# Patient Record
Sex: MALE | Employment: UNEMPLOYED | URBAN - METROPOLITAN AREA
[De-identification: names, ages, dates, MRNs, and addresses within clinical notes are randomized per-mention and may not be internally consistent; named-entity substitution may affect disease eponyms.]

---

## 2020-07-31 ENCOUNTER — HOSPITAL ENCOUNTER (OUTPATIENT)
Dept: BEHAVIORAL HEALTH | Facility: CLINIC | Age: 25
Discharge: HOME | End: 2020-07-31
Attending: PSYCHIATRY & NEUROLOGY | Admitting: PSYCHIATRY & NEUROLOGY
Payer: COMMERCIAL

## 2020-07-31 DIAGNOSIS — F33.1 MAJOR DEPRESSIVE DISORDER, RECURRENT EPISODE, MODERATE (H): ICD-10-CM

## 2020-07-31 PROCEDURE — 90791 PSYCH DIAGNOSTIC EVALUATION: CPT | Mod: 95 | Performed by: SOCIAL WORKER

## 2020-07-31 RX ORDER — VENLAFAXINE HYDROCHLORIDE 225 MG/1
225 TABLET, EXTENDED RELEASE ORAL DAILY
COMMUNITY

## 2020-07-31 RX ORDER — MIRTAZAPINE 15 MG/1
15 TABLET, FILM COATED ORAL AT BEDTIME
COMMUNITY

## 2020-07-31 ASSESSMENT — COLUMBIA-SUICIDE SEVERITY RATING SCALE - C-SSRS
6. HAVE YOU EVER DONE ANYTHING, STARTED TO DO ANYTHING, OR PREPARED TO DO ANYTHING TO END YOUR LIFE?: NO
1. IN THE PAST MONTH, HAVE YOU WISHED YOU WERE DEAD OR WISHED YOU COULD GO TO SLEEP AND NOT WAKE UP?: NO
TOTAL  NUMBER OF INTERRUPTED ATTEMPTS PAST 3 MONTHS: NO
ATTEMPT PAST THREE MONTHS: NO
4. HAVE YOU HAD THESE THOUGHTS AND HAD SOME INTENTION OF ACTING ON THEM?: NO
TOTAL  NUMBER OF INTERRUPTED ATTEMPTS LIFETIME: NO
6. HAVE YOU EVER DONE ANYTHING, STARTED TO DO ANYTHING, OR PREPARED TO DO ANYTHING TO END YOUR LIFE?: NO
TOTAL  NUMBER OF ABORTED OR SELF INTERRUPTED ATTEMPTS PAST 3 MONTHS: NO
2. HAVE YOU ACTUALLY HAD ANY THOUGHTS OF KILLING YOURSELF?: NO
5. HAVE YOU STARTED TO WORK OUT OR WORKED OUT THE DETAILS OF HOW TO KILL YOURSELF? DO YOU INTEND TO CARRY OUT THIS PLAN?: NO
TOTAL  NUMBER OF ABORTED OR SELF INTERRUPTED ATTEMPTS PAST LIFETIME: NO
1. IN THE PAST MONTH, HAVE YOU WISHED YOU WERE DEAD OR WISHED YOU COULD GO TO SLEEP AND NOT WAKE UP?: YES
ATTEMPT LIFETIME: NO

## 2020-07-31 ASSESSMENT — ANXIETY QUESTIONNAIRES
7. FEELING AFRAID AS IF SOMETHING AWFUL MIGHT HAPPEN: MORE THAN HALF THE DAYS
IF YOU CHECKED OFF ANY PROBLEMS ON THIS QUESTIONNAIRE, HOW DIFFICULT HAVE THESE PROBLEMS MADE IT FOR YOU TO DO YOUR WORK, TAKE CARE OF THINGS AT HOME, OR GET ALONG WITH OTHER PEOPLE: EXTREMELY DIFFICULT
4. TROUBLE RELAXING: SEVERAL DAYS
GAD7 TOTAL SCORE: 10
3. WORRYING TOO MUCH ABOUT DIFFERENT THINGS: MORE THAN HALF THE DAYS
5. BEING SO RESTLESS THAT IT IS HARD TO SIT STILL: NOT AT ALL
2. NOT BEING ABLE TO STOP OR CONTROL WORRYING: MORE THAN HALF THE DAYS
6. BECOMING EASILY ANNOYED OR IRRITABLE: MORE THAN HALF THE DAYS
1. FEELING NERVOUS, ANXIOUS, OR ON EDGE: SEVERAL DAYS

## 2020-07-31 ASSESSMENT — PAIN SCALES - GENERAL: PAINLEVEL: NO PAIN (0)

## 2020-07-31 ASSESSMENT — PATIENT HEALTH QUESTIONNAIRE - PHQ9: SUM OF ALL RESPONSES TO PHQ QUESTIONS 1-9: 16

## 2020-07-31 NOTE — ADDENDUM NOTE
Encounter addended by: Bernardo Espinosa LIC on: 7/31/2020 4:25 PM   Actions taken: Clinical Note Signed

## 2020-07-31 NOTE — PATIENT INSTRUCTIONS
"Outpatient Mental Health Services - Adult    MY COPING PLAN FOR SAFETY    PATIENT'S NAME: Chi Henderson  MRN:   [id number]    SAFETY PLAN:    Step 1: Warning signs / cues (Thoughts, images, mood, situation, behavior) that a crisis may be developing:      Thoughts: \"I can't do this anymore\"    Images: none    Thinking Processes: ruminations (can't stop thinking about my problems)    Mood: worsening depression and intense worry    Behaviors: isolating/withdrawing , not taking care of myself and not taking care of my responsibilities    Situations: none       Step 2: Coping strategies - Things I can do to take my mind off of my problems without contacting another person (relaxation technique, physical activity):      Distress Tolerance Strategies:  play games, go to sleep    Physical Activities: exercise    Focus on helpful thoughts:  \"This is temporary\"    Step 3: People and social settings that provide distraction:     Name: Yajaira (mother) Phone:    Name: Florentino (father) Phone:         Step 4: Remind myself of people and things that are important to me and worth living for:  Family    Step 5: When I am in crisis, I can ask these people to help me use my safety plan:     Name: Yajaira (mother) Phone:    Name: Florentino (father) Phone:           Step 6: Making the environment safe:       No, I'm not in danger.      Step 7: Professionals or agencies I can contact during a crisis:      Suicide Prevention Lifeline: 9-912-837-TALK (5304)    Crisis Text Line Service (available 24 hours a day, 7 days a week): Text [place] to 305807    Call  **CRISIS (991393) from a cell phone to talk to a team of professionals who can help you.    Crisis Services By [facility]: Phone Number:   [place]     [phone]   [place]    [phone]   [place]    [phone]   [place]    [phone]   [place]    [phone]   [place] [phone]   [place]     [phone]       Call 911 or go to my nearest emergency department.     I helped develop this " safety plan and agree to use it when needed.  I have been given a copy of this plan.        Today s date:  7/31/2020  Adapted from Safety Plan Template 2008 Katherine Mike and Adrian Malhotra is reprinted with the express permission of the authors.  No portion of the Safety Plan Template may be reproduced without the express, written permission.  You can contact the authors at [email or web address] or [email or web address]

## 2020-07-31 NOTE — PROGRESS NOTES
"    Adult Mental Health Day Treatment  Evaluator Name:  Natividad Al     Credentials:  Health system    PATIENT'S NAME: Chi Henderson  PREFERRED NAME: Chi  PREFERRED PRONOUNS:  Denilson,     MRN:   [id number]  :   1995   ACCT. NUMBER: [id number]  DATE OF SERVICE: 20  START TIME: 2:30pm  END TIME: 3:30pm  PREFERRED PHONE: [phone]   May we leave a program related message: Yes  Service Modality:  Phone Visit:    The patient has been notified of the following:      \"We have found that certain health care needs can be provided without the need for a face to face visit.  This service lets us provide the care you need with a phone conversation.       I will have full access to your [facility] medical record during this entire phone call.   I will be taking notes for your medical record.      Since this is like an office visit, we will bill your insurance company for this service.       There are potential benefits and risks of telephone visits (e.g. limits to patient confidentiality) that differ from in-person visits.?  Confidentiality still applies for telephone services, and nobody will record the visit.  It is important to be in a quiet, private space that is free of distractions (including cell phone or other devices) during the visit.??      If during the course of the call I believe a telephone visit is not appropriate, you will not be charged for this service\"     Consent has been obtained for this service by care team member: Yes     STANDARD ADULT DIAGNOSTIC ASSESSMENT    Identifying Information:  Patient is a 25 year old, .  The pronoun use throughout this assessment reflects the patient's chosen pronoun.  Patient was referred for an assessment by current behavioral health provider.  Patient attended the session alone.     Chief Complaint:   The reason for seeking services at this time is: \" since the beginning of the year I quit my job and started doing nothing. 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 I goldy became depressed \"   The problem(s) began beginning of the year. Patient has attempted to resolve these concerns in the past through medication management and a couple sessons of therapy.  The patient reports he became more depressed, lack of interest in normal activities, low energy, isolating and it's getting worse.  Patient reports his parents insisted he get help so his medication management provider referred him to .     Social/Family History:  Patient reported they grew up in [place], [place].  They were raised by biological parents.   stayed .. The patient has two siblings but they don't keep in contact at this time.     Patient reported that  his   childhood was  \"okay, my brother was violent because he was on drugs, there was lots of tension\".  Patient described their current relationships with family of origin as \"our relationship is good. My mom is my emotional support. \"     The patient describes their cultural background as American. Middle class.  Cultural influences and impact on patient's life structure, values, norms, and healthcare: NA.  Contextual influences on patient's health include: Individual Factors -management of symptoms, unemployed.    These factors will be addressed in the Preliminary Treatment plan.  Patient identified their preferred language to be English. Patient reported they does not need the assistance of an  or other support involved in therapy.     Patient reported had no significant delays in developmental tasks.   Patient's highest education level was college graduate. Patient identified the following learning problems: none reported.  Modifications will not be used to assist communication in therapy.   Patient reports they are  able to understand written materials.    Patient reported the following relationship history.  Patient's current relationship status is single.   Patient identified their sexual orientation as lang.  Patient " reported having zero child(marilou). Patient identified parents as part of their support system.  Patient identified the quality of these relationships as stable and meaningful.      Patient's current living/housing situation involves staying in own home/apartment.  They live with roommates and they report that housing is stable.     Patient is currently unemployed.  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  Patient does not identify finances as a current stressor.      Patient reported that they have not been involved with the legal system.   Patient denies being on probation / parole / under the jurisdiction of the court.        Patient's Strengths and Limitations:  Patient identified the following strengths or resources that will help them succeed in treatment: commitment to health and well being, family support and insight. Things that may interfere with the patient's success in treatment include: none identified.   _______________________________________________  Personal and Family Medical History:   Patient did report a family history of mental health concerns.  Patient reports family history includes Anxiety Disorder in his mother; Bipolar Disorder in his brother; Depression in his mother; Substance Abuse in his brother..     Patient reported the following previous diagnoses which include(s): an Anxiety Disorder, a Bipolar Disorder, Depression and Obsessive Compulsive Disorder.  Patient reported symptoms began at age 9.   Patient has received mental health services in the past: therapy and psychiatry. .  Psychiatric Hospitalizations: None.  Patient denies a history of civil commitment.  Currently, patient is receiving other mental health services.  These include psychiatry with [facility].  Next appointment: unknown.   Patient has had a physical exam to rule out medical causes for current symptoms.  Date of last physical exam was within the past year. Client was encouraged to follow up  with PCP if symptoms were to develop. The patient has a non-[facility] Primary Care Provider. Their PCP is [facility]..  Patient reports no current medical concerns.  There are not significant appetite / nutritional concerns / weight changes.   Patient does not report a history of head injury / trauma / cognitive impairment.      Patient reports current meds as:   Outpatient Medications Marked as Taking for the 7/31/20 encounter (Hospital Encounter) with Bernardo Espinosa LICSW   Medication Sig     mirtazapine (REMERON) 15 MG tablet Take 15 mg by mouth At Bedtime     venlafaxine (EFFEXOR-ER) 225 MG 24 hr tablet Take 225 mg by mouth daily       Medication Adherence:  Patient reports taking prescribed medications as prescribed.    Patient Allergies:  No Known Allergies    Medical History:    Past Medical History:   Diagnosis Date     ADD (attention deficit disorder)      Anxiety      Asperger's disorder      Depressive disorder      OCD (obsessive compulsive disorder)          Current Mental Status Exam:   Unable to complete full MSE. DA completed via a telephone visit      Rating Scales:    PHQ9:    PHQ-9 SCORE 7/31/2020   PHQ-9 Total Score 16   ;    GAD7:    BERYL-7 SCORE 7/31/2020   Total Score 10     CGI:     First:Considering your total clinical experience with this particular patient population, how severe are the patient's symptoms at this time?: 5 (7/31/2020  4:03 PM)  ;    Most recentCompared to the patient's condition at the START of treatment, this patient's condition is: 4 (7/31/2020  4:03 PM)      Substance Use:  Patient did not report a family history of substance use concerns; see medical history section for details.  Patient has not received chemical dependency treatment in the past.  Patient has not ever been to detox.      Patient is not currently receiving any chemical dependency treatment. Patient reported the following problems as a result of their substance use: not  "applicable.    Patient denies using alcohol.  Patient denies using tobacco.  Patient denies using marijuana.  Patient denies using caffeine.  Patient reports using/abusing the following substance(s). Patient reported no other substance use.     CAGE- AID:    CAGE-AID Total Score 7/31/2020   Total Score 0       Substance Use: not applicable    Based on the negative CAGE score and clinical interview there  are not indications of drug or alcohol abuse.      Significant Losses / Trauma / Abuse / Neglect Issues:   Patient did not serve in the .  There are indications or report of significant loss, trauma, abuse or neglect issues related to: none reported.  Concerns for possible neglect are not present.     Safety Assessment:   Current Safety Concerns: The patient reports there is no history of suicide attempts. The patient endorses some suicidal ideation when he is lying in his bed not sleeping. He thinks about it.  He reports he imagines how he would do it but has never acted on it.  Patient reports he would never attempt on his own life because \"I have a respectful fear of death\".  A safety plan was co-developed.       [place] Suicide Severity Rating Scale (Lifetime/Recent)  [place] Suicide Severity Rating (Lifetime/Recent) 7/31/2020   1. Wish to be Dead (Lifetime) Yes   1. Wish to be Dead (Recent) No   2. Non-Specific Active Suicidal Thoughts (Recent) No   3. Active Sucidal Ideation with any Methods (Not Plan) Without Intent to Act (Recent) No   4. Active Suicidal Ideation with Some Intent to Act, Without Specific Plan (Recent) No   5. Active Suicidal Ideation with Specific Plan and Intent (Recent) No   Actual Attempt (Lifetime) No   Actual Attempt (Past 3 Months) No   Has subject engaged in non-suicidal self-injurious behavior? (Lifetime) Yes   Has subject engaged in non-suicidal self-injurious behavior? (Past 3 Months) No   Interrupted Attempts (Lifetime) No   Interrupted Attempts (Past 3 Months) No "   Aborted or Self-Interrupted Attempt (Lifetime) No   Aborted or Self-Interrupted Attempt (Past 3 Months) No   Preparatory Acts or Behavior (Lifetime) No   Preparatory Acts or Behavior (Past 3 Months) No     Patient denies current homicidal ideation and behaviors.  Patient denies current self-injurious ideation and behaviors.   not within year  Patient denied risk behaviors associated with substance use.  Patient denies any high risk behaviors associated with mental health symptoms.  Patient reports the following current concerns for their personal safety: None.  Patient reports there are no firearms in the house. .     History of Safety Concerns:  Patient denied a history of homicidal ideation.     Patient denied a history of personal safety concerns.    Patient denied a history of assaultive behaviors.    Patient denied a history of sexual assault behaviors.     Patient denied a history of risk behaviors associated with substance use.  Patient denies any history of high risk behaviors associated with mental health symptoms.  Patient reports the following protective factors: positive relationships positive family connections, forward/future oriented thinking, dedication to family/friends, safe and stable environment, abstinence from substances, adherence with prescribed medication, agreement to use safety plan, living with other people and healthy fear of risky behaviors or pain    Risk Plan:  See Preliminary Treatment Plan for Safety and Risk Management Plan    Review of Symptoms per patient report:  Depression: Change in sleep, Lack of interest, Change in energy level, Difficulties concentrating, Suicidal ideation, Feelings of helplessness, Low self-worth, Feeling sad, down, or depressed and Withdrawn  Peri:  No Symptoms  Psychosis: No Symptoms  Anxiety: Excessive worry, Ruminations, Poor concentration and Irritability  Panic:  No symptoms  Post Traumatic Stress Disorder:  No Symptoms   Eating Disorder: No  Symptoms  ADD / ADHD:  No symptoms  Conduct Disorder: No symptoms  Autism Spectrum Disorder: No symptoms and cece reports he was diagnosed with aspergers as a child because they thought he was socially awkward  Obsessive Compulsive Disorder: No Symptoms    Patient reports the following compulsive behaviors and treatment history: none.      Diagnostic Criteria:   A) Recurrent episode(s) - symptoms have been present during the same 2-week period and represent a change from previous functioning 5 or more symptoms (required for diagnosis)   - Depressed mood. Note: In children and adolescents, can be irritable mood.     - Diminished interest or pleasure in all, or almost all, activities.    - Fatigue or loss of energy.    - Diminished ability to think or concentrate, or indecisiveness.    - Recurrent thoughts of death (not just fear of dying), recurrent suicidal ideation without a specific plan, or a suicide attempt or a specific plan for committing suicide.   B) The symptoms cause clinically significant distress or impairment in social, occupational, or other important areas of functioning  C) The episode is not attributable to the physiological effects of a substance or to another medical condition  D) The occurence of major depressive episode is not better explained by other thought / psychotic disorders  E) There has never been a manic episode or hypomanic episode    Functional Status:  Patient reports the following functional impairments: management of the household and or completion of tasks, social interactions and work / vocational responsibilities.     WHODAS:   WHODAS 2.0 Total Score 7/31/2020   Total Score 37       Clinical Summary:  1. Reason for assessment: to determine appropriate level of care  .  2. Psychosocial, Cultural and Contextual Factors: unemployed  .  3. Principal DSM5 Diagnoses  (Sustained by DSM5 Criteria Listed Above):   296.32 (F33.1) Major Depressive Disorder, Recurrent Episode, Moderate  _.  4. Other Diagnoses that is relevant to services:   300.02 (F41.1) Generalized Anxiety Disorder by history  5. Provisional Diagnosis:  NA as evidenced by NA .  6. Prognosis: Return to Normal Functioning and Expect Improvement.  7. Likely consequences of symptoms if not treated: If untreated, patient's mental health will likely deteriorate. Patient reports ongoing symptoms of depression and anxiety and passive SI.  A day treatment program is recommended.  PHP was considered but patient felt that would be too intensive.  Patient has protective factors that mitigate risk of harm to self. A safety plan was developed  8. Client strengths include:  goal-focused, good listener, has a previous history of therapy, insightful, motivated and support of family, friends and providers .     Recommendations:     1. Plan for Safety and Risk Management:A safety and risk management plan has been developed including: Patient consented to co-developed safety plan.  Safety and risk management plan was completed.  Patient agreed to use safety plan should any safety concerns arise.  A copy was given to the patient..  Report to child / adult protection services was NA.     2. Patient did not identify Muslim, ethnic or cultural issues relevant to therapy at this time     3. Initial Treatment will focus on: Depressed Mood -   Anxiety -   Functional Impairment at: home  Risk Management / Safety Concerns related to: Suicidal ideation.     4. Resources/Service Plan:       services are not indicated.     Modifications to assist communication are not indicated.     Additional disability accommodations are not indicated.      5. Collaboration:  Collaboration / coordination of treatment will be initiated with the following support professionals: psychiatry.      6.  Referrals:  The following referral(s) will be initiated: Day Treatment. Next Scheduled Appointment: 8/3/20.  A Release of Information has been obtained for the  "following: psychiatry and emergency contact .    7. MATT: MATT:  Discussed the general effects of drugs and alcohol on health and well-being.     8. Records were reviewed at time of assessment.    Information in this assessment was obtained from the medical record and provided by patient who is a good historian.   Patient will have open access to their mental health medical record.      Eval type:  Mental Health    Staff Name/Credentials:  ADITYA Donis  2020                        LOCUS Worksheet     Name: Chi Henderson MRN: [id number]    : 1995      Gender:  male    PMI:     Provider Name: [facility]   Provider NPI:  [id number]     Actual level of Care Provided:  Medication management    Service(s) receiving or referred to:  Day treatment    Reason for Variance: Due to patient reporting worsening symptoms of depression and passive suicidal ideation      Rating completed by: ADITYA Thompson      I. Risk of Harm:   2      Low Risk of Harm    II. Functional Status:   4      Serious Impairment    III. Co-Morbidity:   1      No Co-Morbidity    IV - A. Recovery Environment - Level of Stress:   3      Moderately Stress Environment    IV - B. Recovery Environment - Level of Support:   2      Supportive Environment    V. Treatment and Recovery History:   3      Moderate to Equivocal Response to Treatment and Recovery Management    VI. Engagement and Recovery Project:   2      Positive Engagement and Recovery       17 Composite Score    Level of Care Recommendation:   17 to 19       High Intensity Community Based Services          Outpatient Mental Health Services - Adult    MY COPING PLAN FOR SAFETY    PATIENT'S NAME: Chi Henderson  MRN:   [id number]    SAFETY PLAN:    Step 1: Warning signs / cues (Thoughts, images, mood, situation, behavior) that a crisis may be developing:      Thoughts: \"I can't do this anymore\"    Images: none    Thinking Processes: ruminations (can't stop " "thinking about my problems)    Mood: worsening depression and intense worry    Behaviors: isolating/withdrawing , not taking care of myself and not taking care of my responsibilities    Situations: none       Step 2: Coping strategies - Things I can do to take my mind off of my problems without contacting another person (relaxation technique, physical activity):      Distress Tolerance Strategies:  play games, go to sleep    Physical Activities: exercise    Focus on helpful thoughts:  \"This is temporary\"    Step 3: People and social settings that provide distraction:     Name: Yajaira (mother) Phone:    Name: Florentino (father) Phone:         Step 4: Remind myself of people and things that are important to me and worth living for:  Family    Step 5: When I am in crisis, I can ask these people to help me use my safety plan:     Name: Yajaira (mother) Phone:    Name: Florentino (father) Phone:           Step 6: Making the environment safe:       No, I'm not in danger.      Step 7: Professionals or agencies I can contact during a crisis:      Suicide Prevention Lifeline: 9-023-786-TALK (0704)    Crisis Text Line Service (available 24 hours a day, 7 days a week): Text [place] to 037683    Call  **CRISIS (041227) from a cell phone to talk to a team of professionals who can help you.    Crisis Services By [facility]: Phone Number:   [place]     [phone]   [place]    [phone]   [place]    [phone]   [place]    [phone]   [place]    [phone]   [place] [phone]   [place]     [phone]       Call 911 or go to my nearest emergency department.     I helped develop this safety plan and agree to use it when needed.  I have been given a copy of this plan.        Today s date:  7/31/2020  Adapted from Safety Plan Template 2008 Katherine Mike and Adrian Malhotra is reprinted with the express permission of the authors.  No portion of the Safety Plan Template may be reproduced without the express, written permission.  You can " contact the authors at [email or web address] or [email or web address]

## 2020-08-01 ASSESSMENT — ANXIETY QUESTIONNAIRES: GAD7 TOTAL SCORE: 10

## 2020-08-03 ENCOUNTER — BEH TREATMENT PLAN (OUTPATIENT)
Dept: BEHAVIORAL HEALTH | Facility: CLINIC | Age: 25
End: 2020-08-03
Attending: PSYCHIATRY & NEUROLOGY

## 2020-08-03 ENCOUNTER — HOSPITAL ENCOUNTER (OUTPATIENT)
Dept: BEHAVIORAL HEALTH | Facility: CLINIC | Age: 25
End: 2020-08-03
Attending: PSYCHIATRY & NEUROLOGY
Payer: COMMERCIAL

## 2020-08-03 PROCEDURE — 90853 GROUP PSYCHOTHERAPY: CPT | Mod: GT,95 | Performed by: COUNSELOR

## 2020-08-03 PROCEDURE — G0177 OPPS/PHP; TRAIN & EDUC SERV: HCPCS | Mod: GT

## 2020-08-03 ASSESSMENT — ANXIETY QUESTIONNAIRES
GAD7 TOTAL SCORE: 8
6. BECOMING EASILY ANNOYED OR IRRITABLE: MORE THAN HALF THE DAYS
3. WORRYING TOO MUCH ABOUT DIFFERENT THINGS: SEVERAL DAYS
IF YOU CHECKED OFF ANY PROBLEMS ON THIS QUESTIONNAIRE, HOW DIFFICULT HAVE THESE PROBLEMS MADE IT FOR YOU TO DO YOUR WORK, TAKE CARE OF THINGS AT HOME, OR GET ALONG WITH OTHER PEOPLE: NOT DIFFICULT AT ALL
2. NOT BEING ABLE TO STOP OR CONTROL WORRYING: MORE THAN HALF THE DAYS
5. BEING SO RESTLESS THAT IT IS HARD TO SIT STILL: SEVERAL DAYS
1. FEELING NERVOUS, ANXIOUS, OR ON EDGE: NOT AT ALL
7. FEELING AFRAID AS IF SOMETHING AWFUL MIGHT HAPPEN: SEVERAL DAYS

## 2020-08-03 ASSESSMENT — PATIENT HEALTH QUESTIONNAIRE - PHQ9
SUM OF ALL RESPONSES TO PHQ QUESTIONS 1-9: 14
5. POOR APPETITE OR OVEREATING: SEVERAL DAYS

## 2020-08-03 NOTE — GROUP NOTE
Psychotherapy Group Note    PATIENT'S NAME: Chi Henderson  MRN:   [id number]  :   1995  ACCT. NUMBER: [id number]  DATE OF SERVICE: 20  START TIME:  3:00 PM  END TIME:  3:50 PM  FACILITATOR: Samantha Brown LPCC  TOPIC: MH EBP Group: Coping Skills  Adult Mental Health Day Treatment  TRACK: 1B    NUMBER OF PARTICIPANTS: 2    Telemedicine Visit: The patient's condition can be safely assessed and treated via synchronous audio and visual telemedicine encounter.      Reason for Telemedicine Visit: Services only offered telehealth    Originating Site (Patient Location): Patient's home    Distant Site (Provider Location): Provider Remote Setting    Consent:  The patient/guardian has verbally consented to: the potential risks and benefits of telemedicine (video visit) versus in person care; bill my insurance or make self-payment for services provided; and responsibility for payment of non-covered services.     Mode of Communication:  Video Conference via [employer]    As the provider I attest to compliance with applicable laws and regulations related to telemedicine.      Summary of Group / Topics Discussed:  Coping Skills: Self-Soothe: Patients learned to apply self-soothe as a way to decrease heightened stress in the moment.  Patients identified situations that necessitate self-soothe strategies.  They focused on ways to manage physical symptoms of distress using the senses. They discussed how to distinguish when this can be useful in their lives when other strategies are more relevant or helpful.    Patient Session Goals / Objectives:    Understand the purpose of using the senses to decrease distress    Process what happens in the body when using self-soothe strategies    Demonstrate understanding of when to use self-soothe strategies    Identify and problem solve barriers to applying self-soothe strategies.    Choose 1-2 self-soothe strategies to apply during times of distress.        Patient Participation  / Response:  Fully participated with the group by sharing personal reflections / insights and openly received / provided feedback with other participants.    Demonstrated understanding of topics discussed through group discussion and participation and Expressed understanding of the relevance / importance of coping skills at distressing times in life    Treatment Plan:  Patient has an initial individualized treatment plan that was created as part of their diagnostic assessment / admission process.  A master individualized treatment plan is in the process of being developed with the patient and multi-disciplinary care team.    Samantha Brown, MARQUESC

## 2020-08-03 NOTE — PROGRESS NOTES
"Outpatient Mental Health Services - Adult     MY COPING PLAN FOR SAFETY     PATIENT'S NAME:    Chi Henderson  MRN:                           [id number]     SAFETY PLAN:     Step 1: Warning signs / cues (Thoughts, images, mood, situation, behavior) that a crisis may be developing:     ? Thoughts: \"I can't do this anymore\"  ? Images: none  ? Thinking Processes: ruminations (can't stop thinking about my problems)  ? Mood: worsening depression and intense worry  ? Behaviors: isolating/withdrawing , not taking care of myself and not taking care of my responsibilities  ? Situations: none   ?    Step 2: Coping strategies - Things I can do to take my mind off of my problems without contacting another person (relaxation technique, physical activity):     ? Distress Tolerance Strategies:  play games, go to sleep  ? Physical Activities: exercise  ? Focus on helpful thoughts:  \"This is temporary\"     Step 3: People and social settings that provide distraction:                 Name: Yajaira (mother)            Phone:               Name: Florentino (father)   Phone:                                Step 4: Remind myself of people and things that are important to me and worth living for:  Family     Step 5: When I am in crisis, I can ask these people to help me use my safety plan:                 Name: Yajaira (mother)            Phone:               Name: Florentino (father)   Phone:                                   Step 6: Making the environment safe:      ? No, I'm not in danger.       Step 7: Professionals or agencies I can contact during a crisis:     ? Suicide Prevention Lifeline: 0-256-315-TALK (2029)  ? Crisis Text Line Service (available 24 hours a day, 7 days a week): Text [place] to 704228  ? Call  **CRISIS (538390) from a cell phone to talk to a team of professionals who can help you.          Crisis Services By [facility]: Phone Number:   [place]     [phone]   [place]    [phone]   [place]    [phone]   [place]    [phone] "   [place]    [phone]   [place] [phone]   [place]     [phone]      ? Call 911 or go to my nearest emergency department.             I helped develop this safety plan and agree to use it when needed.  I have been given a copy of this plan.          Today s date:  7/31/2020

## 2020-08-03 NOTE — PROGRESS NOTES
Admission Date: 8/3/2020    Identify any current concerns with potential impact to admission:     medication/medical concerns: no     immediate safety concerns:no    Does patient have safety plan? yes  Note: Please copy safety plan copied into BEH Encounter     Other (insurance/childcare/transportation/housing/planned absences/etc): no    Patient's insurance is: [employer] . Does patient need appointment with provider? No    If patient has Medical Assistance (MA) is LOCUS and Functional Assessment completed? NA    If patient is in Partial Hospitalization Program is LOCUS completed? NA                                                                                              Completed by: Samantha Brown MA [facility]

## 2020-08-03 NOTE — GROUP NOTE
Process Group Note    PATIENT'S NAME: Chi Henderson  MRN:   [id number]  :   1995  ACCT. NUMBER: [id number]  DATE OF SERVICE: 20  START TIME:  1:00 PM  END TIME:  1:50 PM  FACILITATOR: Samantha Brown LPCC  TOPIC:  Process Group    Diagnoses:  296.32 (F33.1) Major Depressive Disorder, Recurrent Episode, Moderate _.  4. Other Diagnoses that is relevant to services:   300.02 (F41.1) Generalized Anxiety     Adult Mental Health Day Treatment  TRACK: 1B    NUMBER OF PARTICIPANTS: 3  Telemedicine Visit: The patient's condition can be safely assessed and treated via synchronous audio and visual telemedicine encounter.      Reason for Telemedicine Visit: Services only offered telehealth    Originating Site (Patient Location): Patient's home    Distant Site (Provider Location): Provider Remote Setting    Consent:  The patient/guardian has verbally consented to: the potential risks and benefits of telemedicine (video visit) versus in person care; bill my insurance or make self-payment for services provided; and responsibility for payment of non-covered services.     Mode of Communication:  Video Conference via [employer]    As the provider I attest to compliance with applicable laws and regulations related to telemedicine.            Data:    Session content: At the start of this group, patients were invited to check in by identifying themselves, describing their current emotional status, and identifying issues to address in this group.   Area(s) of treatment focus addressed in this session included Symptom Management.  He reported feeling okay. His goal is to stay awake and attend all of group. He indicated constant passive SI, with no plan or intent. He denied chemical use. He provided feedback to the group. He was open to support.     Therapeutic Interventions/Treatment Strategies:  Psychotherapist offered support, feedback and validation.    Assessment:    Patient response:   Patient responded to session  by accepting feedback, giving feedback, listening and being attentive    Possible barriers to participation / learning include: and no barriers identified    Health Issues:   None reported       Substance Use Review:   Substance Use: No active concerns identified.    Mental Status/Behavioral Observations  Appearance:   Appropriate   Eye Contact:   Fair   Psychomotor Behavior: Restless   Attitude:   Cooperative  Friendly  Orientation:   All  Speech   Rate / Production: Pressured    Volume:  Normal   Mood:    Anxious  Depressed   Affect:    Constricted   Thought Content:   Rumination and Safety reports  presence of suicidal ideation passive suicidal ideation   Thought Form:  Coherent  Logical     Insight:    Fair     Plan:     Safety Plan: Recommended that patient call 911 or go to the local ED should there be a change in any of these risk factors.     Barriers to treatment: None identified    Patient Contracts (see media tab):  None    Substance Use: Not addressed in session     Continue or Discharge: Patient will continue in Adult Day Treatment (ADT)  as planned. Patient is likely to benefit from learning and using skills as they work toward the goals identified in their treatment plan.      ADILENE Hartman  August 3, 2020

## 2020-08-03 NOTE — GROUP NOTE
Psychoeducation Group Note    PATIENT'S NAME: Chi Henderson  MRN:   [id number]  :   1995  ACCT. NUMBER: [id number]  DATE OF SERVICE: 20  START TIME:  2:00 PM  END TIME:  2:50 PM  FACILITATOR: Ted Boles OT  TOPIC: MH Life Skills Group: Sensory Approaches in Mental Health  Adult Mental Health Day Treatment  TRACK: *1B    Telemedicine Visit: The patient's condition can be safely assessed and treated via synchronous audio and visual telemedicine encounter.      Reason for Telemedicine Visit: Services only offered telehealth and Covid 19    Originating Site (Patient Location): Patient's home    Distant Site (Provider Location): Provider Remote Setting    Consent:  The patient/guardian has verbally consented to: the potential risks and benefits of telemedicine (video visit) versus in person care; bill my insurance or make self-payment for services provided; and responsibility for payment of non-covered services.     Mode of Communication:  Video Conference via [employer]    As the provider I attest to compliance with applicable laws and regulations related to telemedicine.      NUMBER OF PARTICIPANTS: 3    Summary of Group / Topics Discussed:  Sensory Approaches in Mental Health:  Focused Activity: Patients were provided with verbal and experiential education to identify physical and sensorimotor based activities that can be utilized for stress management, self care, health maintenance, and self regulation.  Patients increased knowledge and awareness of activities that support good self care, build resiliency, and prevent relapse through healthy engagement in mindful focused activities for effective coping with illness and reduction of maladaptive coping skills.     Patient Session Goals / Objectives:    Identified specific physical and sensorimotor based activities for stress management, self care, health maintenance, and self regulation      Improved awareness of activities that are meaningful  focused activities that support good self care, build resiliency, and prevent relapse and how this applies to current daily life    Established a plan for practice of these skills in their own environments    Practiced and reflected on how to generalize taught skills to their everyday life      Patient Participation / Response:  Fully participated with the group by sharing personal reflections / insights and openly received / provided feedback with other participants.    Verbalized understanding of content and Patient would benefit from additional opportunities to practice the content to be able to generalize it to their everyday life with increased intentionality, consistency, and efficacy in support of their psychiatric recovery    Treatment Plan:  Patient has an initial individualized treatment plan that was created as part of their diagnostic assessment / admission process.  A master individualized treatment plan is in the process of being developed with the patient and multi-disciplinary care team.    Ted Boles, OT

## 2020-08-04 ASSESSMENT — ANXIETY QUESTIONNAIRES: GAD7 TOTAL SCORE: 8

## 2020-08-04 NOTE — PROGRESS NOTES
Acknowledgement of Current Treatment Plan       I have reviewed my treatment plan with my therapist on 8/10/20.   I agree with the plan as it is written in the electronic health record. (1B)    Name:      Signature:  Chi Sparks MD  Psychiatrist    Dr. Stephani Garcia, [facility],     Brenna Alcantara, [facility], [facility]  Psychotherapist

## 2020-08-04 NOTE — DISCHARGE SUMMARY
"       Adult Mental Health Intensive Outpatient Discharge Summary/Instructions      Patient: Chi Henderson MRN: [id number]   : 1995 Age: 25 year old Sex: male     Admission Date: 8/3/20  Discharge Date: 20  Diagnosis: 296.32 (F33.1) Major Depressive Disorder, Recurrent Episode, Moderate  300.02 (F41.1) Generalized Anxiety Disorder     Focus of Treatment / Progress: You were discharged from the Adult Day Treatment IOP due to multiple absences without contacting staff.  Recommendations are to discuss other treatment options with your psychiatrist, continue to take medications as prescribed, and avoid drugs and alcohol.     Personal Safety:      * Follow your safety plan     * Call crisis lines as needed:    [facility] [phone] [facility] [phone]  [facility] [phone] Crisis Connection [phone]  [facility] [phone] [facility] [phone]  [facility] [phone] National Suicide Prevention [phone]  [facility] [phone] Suicide Prevention [phone]  [facility] [phone]    Managing symptoms of:  Depression     Community support/health:  [place] [address] ([phone]) [email or web address] [place] Crisis text line( Text [place] to 224092),  [facility]  [place], [place] ([phone])  [facility] [place], [place] ( [phone])     Managing Symptoms and Preventing Relapse    * Go to all of your appointments    * Take all medications as directed.      * Carry a current list if medication with you    * Do not use illicit (street) drugs.  Avoid alcohol    * Report these symptoms to your care team. These are early signs of relapse:   Thoughts of suicide   Losing more sleep   Increased confusion   Mood getting worse   Feeling more aggressive   Other:  Substance use    *Use these skills daily:  Talk to someone you trust at least one time weekly, set boundaries and say \"no\", be assertive, act " opposite of negative feelings, accept challenges with a positive attitude, exercise at least three times per week for 30 minutes,  get enough sleep, eat healthy foods, get into a good routine    Copy of summary sent to: In Epic    Follow up with psychiatrist / main caregiver: Hipolito Brooks   Next visit: Unknown    Follow up with your therapist: N/A   Next visit: N/A    Go to group therapy and / or support groups at: [facility] Connection and [facility] [place]([facility]) support groups    See your medical doctor about:  For an annual physical exam or any general wellness or illness as needed.    Other:  Your treatment team appreciates having the opportunity to work with you and wishes you the best.    Client Signature:__unable to sign due to COVID-19_____________________  Date / Time:___________  Staff Signature:__Brenna Alcantara [facility] on 9/23/2020 at 3:07 PM  ______________________   Date / Time:___________

## 2020-08-04 NOTE — PROGRESS NOTES
Adult Day Treatment Program:  Individualized Treatment Plan     Date of Plan: 8/10/20    Name: Chi Henderson MRN: [id number]    : 1995    Programs:  Adult Day Treatment (ADT)     Clinical Track (if applicable):  1B    DSM5 Diagnosis  296.32 (F33.1) Major Depressive Disorder, Recurrent Episode, Moderate  300.02 (F41.1) Generalized Anxiety Disorder     Adult Day Treatment Program Multidisciplinary Team Members: Edward Sparks MD and/or Dr. Stephani Garcia;   Brenna Alcantara, LPCC, LADC, Jimmie Lyn, OTR/L, Ted Boles, MOTR/L, Lou Conway RN, BSN    Chi Henderson will participate in the Adult Day Treatment Program  3 days per week, 3 hours per day. Anticipated duration/discharge: 12 weeks    Due to COVID-19, services will be delivered via telemedicine until further notice.     Program Start Date: 8/3/20  Anticipated Discharge Date: 10/29/20 (pending authorization/clinical changes)    NOTE: Complete CGI     Review Date: Does Chi Henderson continue to meet criteria to participate in the Adult Day Treatment Program, 3 days per week; 3 hours per day?   20 no       Client Strengths:  goal-focused, good listener, has a previous history of therapy, insightful, motivated and support of family, friends and providers     Client Participation in Plan:  Contributed to goals and plan     Areas of Vulnerability:  Suicidal Ideation   Anxiety  Depressive symptoms     Long-Term Goals:  Knowledge about illness and management of symptoms   Maintenance of personal safety     Abuse Prevention Plan:  Safe, therapeutic environment   Safety coping plan as needed   Education regarding illness and skill development   Coordination with care providers     Discharge Criteria:  Satisfactory progress toward treatment goals   Improvement re: identified problems and symptoms   Ability to continue recovery at next level of service   Has a discharge plan in place   Has safety/coping plan in place      Areas of Treatment  "Focus        Area of Treatment Focus:   Personal Safety  Start Date:    8/10/20    Goal:  Target Date: 10/5/20 Status: Stopped  Chi will notify staff when needing assistance to develop or implement a coping plan to manage suicidal or self injurious urges.Use coping plan for safety, as needed.      Progress: 8/5/2020 Patient reported experiencing passive, fleeting, suicidal ideation. He denies intent. The plan is \"not well thought out\" -take money, buy a gun, and kill himself. He denied immediate safety concerns. He is open to letting staff know if anything with his safety changes.     9/23/20: Chi did not report any recent suicidal ideation.  He was discharged prior to completion of all goals due to poor attendance.  STOPPED        Treatment Strategies:   Assess / reassess level of potential for harm to self or others  Engage in safety planning when indicated  Facilitate increased self awareness          Area of Treatment Focus:   Symptom Stabilization and Management  Start Date:    8/10/20    Goal:  Target Date: 10/5/20 Status: Stopped  When in life skills Chi  will provide an update related to perceived progress with mental health recovery weekly.                                                                                                                                                 Progress: 8/5/2020 Patient was in agreement with goal.        9/23/30: Chi was unabel to make much progress towards coping with his mental health symptoms.  He was discharged prior to completion of all goals due to poor attendance.  STOPPED      Treatment Strategies:   Facilitate increased self awareness  Provide education regarding strategies and coping skills to asssit with mental health recovery          Area of Treatment Focus:   Wellness and Mental Health  Start Date:    8/10/20    Goal:  Target Date: 10/5/20 Status: Stopped  Chi will improve wellness related behaviors by getting enough sleep,exercise, " balanced nutrition and take medications (if prescribed) to maintain good mental health.      Progress: 8/5/20202 patient understands goal and is open to working on it.        9/23/20: Chi was unable to improve any wellness  Behaviors prior to discharge. He was discharged prior to completion of all goals due to poor attendance.  STOPPED      Treatment Strategies:   Facilitate increased self awareness  Provide education regarding wellness habits and routines which can asssit with mental health recovery          Area of Treatment Focus:   Community Resources / Support and Discharge Planning  Start Date:    8/10/20    Goal:  Target Date: 10/5/20 Status: Stopped  Chi will establish an aftercare plan to include medical providers and social supports by discharge.      Progress: 8/5/2020 Patient needs therapist referrals for weekly sessions. He has a medication provider in the community, Dr. Gutierrez. Patient will start to think about follow-up care after discharge.       9/23/20: Chi attended an appointment with his psychiatrist but did not establish care with a therapist prior to discharge.  STOPPED    Treatment Strategies:   Facilitate increased self awareness  Provide education regarding relapse prevention,discharge planning and mental health support            Area of Treatment Focus:   Symptom Stabilization and Management  Start Date:    8/10/20    Goal:  Target Date: 10/5/20 Status: Stopped  Patient work on processing emotions and effectively implementing skills to reduce his depression.      Progress: 8/5/2020 patient was in agreement with the goal area.       9/23/20: Chi was discharged prior to completion of all goals due to poor attendance.  STOPPED      Treatment Strategies:   Assist to identify treatment goals  Facilitate increased self awareness  Teach adaptive coping skills and communication skills  Use reality based supportive approach     Charles Lyn, OTR/L      NOTE: Required signatures are  "completed manually and scanned into the electronic medical record. See \"Media\" tab in epic.    The Individualized Treatment Plan Signature Page has been routed to the provider for co-sign.      "

## 2020-08-05 ENCOUNTER — TELEPHONE (OUTPATIENT)
Dept: BEHAVIORAL HEALTH | Facility: CLINIC | Age: 25
End: 2020-08-05

## 2020-08-05 NOTE — TELEPHONE ENCOUNTER
Writer called to conduct RN health screen. Left VM requesting call back. Will reattempt.  Lou Conway RN on 8/5/2020 at 9:47 AM

## 2020-08-06 ENCOUNTER — TELEPHONE (OUTPATIENT)
Dept: BEHAVIORAL HEALTH | Facility: CLINIC | Age: 25
End: 2020-08-06

## 2020-08-06 NOTE — TELEPHONE ENCOUNTER
Writer called Pt since he was not in group at the 1:00 hour.  He did not answer.  A message was left asking him to call this writer back since he missed yesterday and today.

## 2020-08-07 ENCOUNTER — TELEPHONE (OUTPATIENT)
Dept: BEHAVIORAL HEALTH | Facility: CLINIC | Age: 25
End: 2020-08-07

## 2020-08-07 NOTE — TELEPHONE ENCOUNTER
Writer called to conduct RN health screen. Left VM requesting call back. Will reattempt.  Lou Conway RN on 8/7/2020 at 3:20 PM

## 2020-08-11 ENCOUNTER — TELEPHONE (OUTPATIENT)
Dept: BEHAVIORAL HEALTH | Facility: CLINIC | Age: 25
End: 2020-08-11

## 2020-08-11 NOTE — TELEPHONE ENCOUNTER
Naifr attempted to reach Pt today via phone.  He has not answered the lat few phone calls.  He attended programming only one day.  Writer asked that he return the call to confirm whether he is interested in programming or not.  We will discharge by tomorrow if he does not respond.  He does not have safety concerns noted on the day that he last attended.  However, since he is part of the program, if we do not hear back from him by tomorrow morning will call his emergency contact prior to a final discharge.   communicated the plan with the treatment team.

## 2020-08-12 ENCOUNTER — HOSPITAL ENCOUNTER (OUTPATIENT)
Dept: BEHAVIORAL HEALTH | Facility: CLINIC | Age: 25
End: 2020-08-12
Attending: PSYCHIATRY & NEUROLOGY
Payer: COMMERCIAL

## 2020-08-12 ENCOUNTER — TELEPHONE (OUTPATIENT)
Dept: BEHAVIORAL HEALTH | Facility: CLINIC | Age: 25
End: 2020-08-12

## 2020-08-12 PROCEDURE — 90853 GROUP PSYCHOTHERAPY: CPT | Mod: GT,95

## 2020-08-12 NOTE — GROUP NOTE
"Process Group Note    PATIENT'S NAME: Chi Henderson  MRN:   [id number]  :   1995  ACCT. NUMBER: [id number]  DATE OF SERVICE: 20  START TIME:  2:00 PM  END TIME:  2:50 PM  FACILITATOR: Brenna Alcantara LPCC  TOPIC: MH Process Group    Diagnoses:  ***    Adult Mental Health Day Treatment  TRACK: 1B    NUMBER OF PARTICIPANTS: 5    Telemedicine Visit: The patient's condition can be safely assessed and treated via synchronous audio and visual telemedicine encounter.      Reason for Telemedicine Visit: Services only offered telehealth    Originating Site (Patient Location): Patient's home    Distant Site (Provider Location): Provider Remote Setting    Consent:  The patient/guardian has verbally consented to: the potential risks and benefits of telemedicine (video visit) versus in person care; bill my insurance or make self-payment for services provided; and responsibility for payment of non-covered services.     Mode of Communication:  Video Conference via [employer]    As the provider I attest to compliance with applicable laws and regulations related to telemedicine.            Data:    Session content: At the start of this group, patients were invited to check in by identifying themselves, describing their current emotional status, and identifying issues to address in this group.   Area(s) of treatment focus addressed in this session included {OP BEH ADULT MH AREA OF TREATMENT FOCUS:176990}.  ***    Therapeutic Interventions/Treatment Strategies:  {OP MH THERAPEUTIC INTERVENTIONS/TREATMENT:772950}    Assessment:    Patient response:   Patient responded to session by {PATIENT RESPONSE:386487}    Possible barriers to participation / learning include: {POSSIBLE BARRIERS:155615}    Health Issues:   {YES / NO:032808}       Substance Use Review:   Substance Use: {YES / NO:820981}    Mental Status/Behavioral Observations  Appearance:   {Appearance:157622::\"Appropriate \"}  Eye Contact:   {Eye " "Contact:891750::\"Good \"}  Psychomotor Behavior: {Psychomotor Behavior:010152::\"Normal \"}  Attitude:   {Attitude:072261::\"Cooperative \"}  Orientation:   {Orientation:148274::\"All\"}  Speech   Rate / Production: {Speech Rate/Production:143847::\"Normal \"}   Volume:  {Speech Volume:595532::\"Normal \"}  Mood:    {Mood:661482::\"Normal\"}  Affect:    {Affect:361492::\"Appropriate \"}  Thought Content:   {Thought Content with Safety:293547}  Thought Form:  {Thought Form:331578::\"Coherent \",\"Logical \"}    Insight:    {Insight:472893::\"Good \"}    Plan:     Safety Plan: {SAFETY PLAN:504842}     Barriers to treatment: {Barriers to Treatment:278981}    Patient Contracts (see media tab):  {Patient Contracts:561977}    Substance Use: {SUBSTANCE USE ASSESSMENT/INTERVENTION:606259}     Continue or Discharge: {Continue or Discharge:367201}      Brenna Alcantara, [facility]  August 12, 2020  "

## 2020-08-12 NOTE — TELEPHONE ENCOUNTER
Writer received a call back from Pt's Emergency Contact.  He is safe and says he has been missing appointments but plans to attend today.  Will invite via text.

## 2020-08-12 NOTE — TELEPHONE ENCOUNTER
Writer attempted to call Pt again this morning but no answer.  Writer left a message explaining was going to call his emergency contact.  Writer did call his mother who is listed as EC from the [facility].  No one answered that call either.  A brief message was left asking for a return call.

## 2020-08-12 NOTE — GROUP NOTE
Psychotherapy Group Note    PATIENT'S NAME: Chi Henderson  MRN:   [id number]  :   1995  ACCT. NUMBER: [id number]  DATE OF SERVICE: 20  START TIME:  1:00 PM  END TIME:  1:50 PM  FACILITATOR: Yasmine Sparks  TOPIC:  EBP Group: Behavioral Activation  Adult Mental Health Day Treatment  TRACK: 1B    Telemedicine Visit: The patient's condition can be safely assessed and treated via synchronous audio and visual telemedicine encounter.      Reason for Telemedicine Visit:  covid 19    Originating Site (Patient Location): Patient's home    Distant Site (Provider Location): Provider Remote Setting    Consent:  The patient/guardian has verbally consented to: the potential risks and benefits of telemedicine (video visit) versus in person care; bill my insurance or make self-payment for services provided; and responsibility for payment of non-covered services.     Mode of Communication:  Video Conference via [employer]    As the provider I attest to compliance with applicable laws and regulations related to telemedicine.      NUMBER OF PARTICIPANTS: 4    Summary of Group / Topics Discussed:  Behavioral Activation: Motivation and Procrastination: Patients explored how they currently spend their time, identifying thoughts and feelings that are motivating and serve to increase desired behaviors.  They also examined behaviors that contribute to procrastination.  Different types of procrastination behaviors were identified, and strategies to reduce individual procrastination and increase motivation were explored and practiced.  Patients identified ways to increase goal-directed activities to enhance mood and reduce symptoms.        Patient Session Goals / Objectives:    Identify current patterns of procrastination behavior and how they influence thoughts and moods, and inhibit motivation.    Identify behaviors that can be implemented that contribute to improving thoughts and feelings, motivation, and reduce  symptoms.    Identify and develop a plan to increase activities that promote a sense of accomplishment and competence.    Practice scheduling positive activities / behaviors into daily routines.      Patient Participation / Response:  Moderately participated, sharing some personal reflections / insights and adequately adequately received / provided feedback with other participants.    Demonstrated understanding of topics discussed through group discussion and participation and Expressed understanding of the relationship between behaviors, thoughts, and feelings    Treatment Plan:  Patient has a current master individualized treatment plan.  See Epic treatment plan for more information.    Yasmine Sparks

## 2020-08-13 ENCOUNTER — HOSPITAL ENCOUNTER (OUTPATIENT)
Dept: BEHAVIORAL HEALTH | Facility: CLINIC | Age: 25
End: 2020-08-13
Attending: PSYCHIATRY & NEUROLOGY
Payer: COMMERCIAL

## 2020-08-13 ENCOUNTER — TELEPHONE (OUTPATIENT)
Dept: BEHAVIORAL HEALTH | Facility: CLINIC | Age: 25
End: 2020-08-13

## 2020-08-13 PROCEDURE — 90853 GROUP PSYCHOTHERAPY: CPT | Mod: GT | Performed by: SOCIAL WORKER

## 2020-08-13 PROCEDURE — G0177 OPPS/PHP; TRAIN & EDUC SERV: HCPCS | Mod: GT,95

## 2020-08-13 PROCEDURE — 90853 GROUP PSYCHOTHERAPY: CPT | Mod: GT | Performed by: COUNSELOR

## 2020-08-13 NOTE — GROUP NOTE
Telemedicine Visit: The patient's condition can be safely assessed and treated via synchronous audio and visual telemedicine encounter.      Reason for Telemedicine Visit: Services only offered telehealth    Originating Site (Patient Location): Patient's home    Distant Site (Provider Location): [facility]: [place]    Consent:  The patient/guardian has verbally consented to: the potential risks and benefits of telemedicine (video visit) versus in person care; bill my insurance or make self-payment for services provided; and responsibility for payment of non-covered services.     Mode of Communication:  Video Conference via [employer]    As the provider I attest to compliance with applicable laws and regulations related to telemedicine.    Psychotherapy Group Note    PATIENT'S NAME: Chi Henderson  MRN:   [id number]  :   1995  ACCT. NUMBER: [id number]  DATE OF SERVICE: 20  START TIME:  3:00 PM  END TIME:  3:50 PM  FACILITATOR: Devika Lucia LICSW  TOPIC:  EBP Group: Cognitive Restructuring  Adult Mental Health Day Treatment  TRACK: 1B    NUMBER OF PARTICIPANTS: 3    Summary of Group / Topics Discussed:  Cognitive Restructuring: Core Beliefs: Patients received an overview of what a core belief is, and how they develop. Patients created personal affirmation statements by naming strengths. We discussed the benefits of  cognitive tools and how they assist in creating homeostasis in the nervous system.  Patients named a personal affirmation statement to improve self-image and functioning.      Patient Session Goals / Objectives:    Familiarize self by naming strengths and  concept of  self.       Acknowledge cognitive themes    Develop / advance recognition of  negative thoughts within a depression and anxiety episode.    Formulate new neutral/positive core beliefs using strengths.                   Patient Participation / Response:  Fully participated with the group by sharing  personal reflections / insights and openly received / provided feedback with other participants.    Demonstrated understanding of topics discussed through group discussion and participation and Practiced skills in session    Treatment Plan:  Patient has a current master individualized treatment plan.  See Epic treatment plan for more information.    Devika Lucia, LICSW

## 2020-08-13 NOTE — GROUP NOTE
Psychoeducation Group Note    PATIENT'S NAME: Chi Henderson  MRN:   [id number]  :   1995  ACCT. NUMBER: [id number]  DATE OF SERVICE: 20  START TIME:  1:00 PM  END TIME:  1:50 PM  FACILITATOR: Lou Conway RN  TOPIC: MH RN Group: Health Maintenance  Telemedicine Visit: The patient's condition can be safely assessed and treated via synchronous audio and visual telemedicine encounter.      Reason for Telemedicine Visit:  covid19    Originating Site (Patient Location): Patient's home    Distant Site (Provider Location): Provider Remote Setting    Consent:  The patient/guardian has verbally consented to: the potential risks and benefits of telemedicine (video visit) versus in person care; bill my insurance or make self-payment for services provided; and responsibility for payment of non-covered services.     Mode of Communication:  Video Conference via [employer]    As the provider I attest to compliance with applicable laws and regulations related to telemedicine.      Adult Mental Health Day Treatment  TRACK: 1B    NUMBER OF PARTICIPANTS: 5    Summary of Group / Topics Discussed:  Health Maintenance: Eight Dimensions of Wellness: The concept of holistic health through the model of eight dimensions was introduced. Group members participated in identifying behaviors and activities in each of the dimensions of wellness.  The importance of each dimension was reinforced and the concept of balance in life as it relates to wellness was explored.      Patient Session Goals / Objectives:    Verbalized understanding of balance in wellness and how it relates to their life    Identified and explained the eight dimensions of wellness    Categorized activities and wellness needs into corresponding dimensions appropriately during exercise          Patient Participation / Response:  Fully participated with the group by sharing personal reflections / insights and openly received / provided feedback with other  participants.    Demonstrated understanding of topics discussed through group discussion and participation and Verbalized understanding of health maintenance topic    Treatment Plan:  Patient has an initial individualized treatment plan that was created as part of their diagnostic assessment / admission process.  A master individualized treatment plan is in the process of being developed with the patient and multi-disciplinary care team.    Lou Conway RN

## 2020-08-13 NOTE — TELEPHONE ENCOUNTER
Writer called to conduct RN health screen. Left VM requesting call back. Will reattempt.  Lou Conway RN on 8/13/2020 at 9:30 AM

## 2020-08-13 NOTE — GROUP NOTE
"Process Group Note    PATIENT'S NAME: Chi Henderson  MRN:   [id number]  :   1995  ACCT. NUMBER: [id number]  DATE OF SERVICE: 20  START TIME:  2:00 PM  END TIME:  2:50 PM  FACILITATOR: Brenna Alcantara [facility]  TOPIC:  Process Group    Diagnoses:  296.32 (F33.1) Major Depressive Disorder, Recurrent Episode, Moderate _.  4. Other Diagnoses that is relevant to services:   300.02 (F41.1) Generalized Anxiety     Adult Mental Health Day Treatment  TRACK: 1B    NUMBER OF PARTICIPANTS: 5    Telemedicine Visit: The patient's condition can be safely assessed and treated via synchronous audio and visual telemedicine encounter.      Reason for Telemedicine Visit: Services only offered telehealth    Originating Site (Patient Location): Patient's home    Distant Site (Provider Location): Provider Remote Setting    Consent:  The patient/guardian has verbally consented to: the potential risks and benefits of telemedicine (video visit) versus in person care; bill my insurance or make self-payment for services provided; and responsibility for payment of non-covered services.     Mode of Communication:  Video Conference via [employer]    As the provider I attest to compliance with applicable laws and regulations related to telemedicine.            Data:    Session content: At the start of this group, patients were invited to check in by identifying themselves, describing their current emotional status, and identifying issues to address in this group.   Area(s) of treatment focus addressed in this session included Symptom Management and Personal Safety.    Chi reported feeling \"as well as I can\" today.  His goal for the day was to show up to group, which he was proud of himself for doing.  He reported he has no other plans for the day and intends to remain in bed.  Writer and group discussed the importance of creating purpose and reasons to get out of bed.      Therapeutic Interventions/Treatment " Strategies:  Psychotherapist offered support, feedback and validation and reinforced use of skills. Treatment modalities used include Motivational Interviewing, Cognitive Behavioral Therapy and Dialectical Behavioral Therapy. Interventions include Behavioral Activation: Encouraged strategies to reduce individual procrastination and increase motivation by increasing goal-directed activities to enhance mood and reduce symptoms..    Assessment:    Patient response:   Patient responded to session by listening    Possible barriers to participation / learning include: and no barriers identified    Health Issues:   None reported       Substance Use Review:   Substance Use: No active concerns identified.    Mental Status/Behavioral Observations  Appearance:   Appropriate   Eye Contact:   Good   Psychomotor Behavior: Normal   Attitude:   Cooperative   Orientation:   All  Speech   Rate / Production: Normal    Volume:  Normal   Mood:    Depressed   Affect:    Blunted   Thought Content:   Clear  Thought Form:  Coherent  Logical     Insight:    Fair     Plan:     Safety Plan: No current safety concerns identified.  Recommended that patient call 911 or go to the local ED should there be a change in any of these risk factors.     Barriers to treatment: None identified    Patient Contracts (see media tab):  None    Substance Use: Not addressed in session     Continue or Discharge: Patient will continue in Adult Day Treatment (ADT)  as planned. Patient is likely to benefit from learning and using skills as they work toward the goals identified in their treatment plan.      Brenna Alcantara, [facility]  August 13, 2020

## 2020-08-14 ENCOUNTER — TELEPHONE (OUTPATIENT)
Dept: BEHAVIORAL HEALTH | Facility: CLINIC | Age: 25
End: 2020-08-14

## 2020-08-14 NOTE — TELEPHONE ENCOUNTER
Writer called to conduct RN health screen. Left VM requesting call back. Will reattempt.  Lou Conway RN on 8/14/2020 at 10:33 AM

## 2020-08-17 ENCOUNTER — HOSPITAL ENCOUNTER (OUTPATIENT)
Dept: BEHAVIORAL HEALTH | Facility: CLINIC | Age: 25
End: 2020-08-17
Attending: PSYCHIATRY & NEUROLOGY
Payer: COMMERCIAL

## 2020-08-17 PROCEDURE — G0177 OPPS/PHP; TRAIN & EDUC SERV: HCPCS | Mod: GT,95

## 2020-08-17 PROCEDURE — 90853 GROUP PSYCHOTHERAPY: CPT | Mod: GT,95 | Performed by: COUNSELOR

## 2020-08-17 NOTE — GROUP NOTE
Psychotherapy Group Note    PATIENT'S NAME: Chi Henderson  MRN:   [id number]  :   1995  ACCT. NUMBER: [id number]  DATE OF SERVICE: 20  START TIME: 1500  END TIME: 1550  FACILITATOR: Brenna Alcantara [facility]  TOPIC: MH EBP Group: Emotions Management  Adult Mental Health Day Treatment  TRACK: 1B    NUMBER OF PARTICIPANTS: 5    Telemedicine Visit: The patient's condition can be safely assessed and treated via synchronous audio and visual telemedicine encounter.      Reason for Telemedicine Visit: Services only offered telehealth    Originating Site (Patient Location): Patient's home    Distant Site (Provider Location): Provider Remote Setting    Consent:  The patient/guardian has verbally consented to: the potential risks and benefits of telemedicine (video visit) versus in person care; bill my insurance or make self-payment for services provided; and responsibility for payment of non-covered services.     Mode of Communication:  Video Conference via [employer]    As the provider I attest to compliance with applicable laws and regulations related to telemedicine.      Summary of Group / Topics Discussed:  Emotions Management: Understanding Emotions: Patients discussed the purpose of emotions and function they serve in our lives.  Reviewed core emotions, why they happen (triggers), and how they occur. The group assisted one anothers' understanding that: emotional experiences are important; difficult emotions have a place in our lives; and the differences between various emotions.    Patient Session Goals / Objectives:    Demonstrate understanding of types various emotions    Identify and discuss specific emotions and when they occur; understand triggers    Discuss barriers to emotional regulation      Patient Participation / Response:  Fully participated with the group by sharing personal reflections / insights and openly received / provided feedback with other participants.    Demonstrated understanding of  topics discussed through group discussion and participation, Expressed understanding of the relevance / importance of emotions management skills at distressing times in life, Self-aware of experiences with difficult emotions, and strategies to employ to manage them, Demonstrated knowledge of when to consider applying a variety of emotions management skills in daily life and Demonstrated understanding and practice strategies to manage difficult emotions and move towards healing    Treatment Plan:  Patient has a current master individualized treatment plan.  See Epic treatment plan for more information.    Brenna Alcantara, [facility]

## 2020-08-17 NOTE — GROUP NOTE
"Process Group Note    PATIENT'S NAME: Chi Henderson  MRN:   [id number]  :   1995  ACCT. NUMBER: [id number]  DATE OF SERVICE: 20  START TIME:  1:00 PM  END TIME:  1:50 PM  FACILITATOR: Brenna Alcantara [facility]  TOPIC:  Process Group    Diagnoses:  296.32 (F33.1) Major Depressive Disorder, Recurrent Episode, Moderate _.  4. Other Diagnoses that is relevant to services:   300.02 (F41.1) Generalized Anxiety     Adult Mental Health Day Treatment  TRACK: 1B    NUMBER OF PARTICIPANTS: 5    Telemedicine Visit: The patient's condition can be safely assessed and treated via synchronous audio and visual telemedicine encounter.      Reason for Telemedicine Visit: Services only offered telehealth    Originating Site (Patient Location): Patient's home    Distant Site (Provider Location): Provider Remote Setting    Consent:  The patient/guardian has verbally consented to: the potential risks and benefits of telemedicine (video visit) versus in person care; bill my insurance or make self-payment for services provided; and responsibility for payment of non-covered services.     Mode of Communication:  Video Conference via [employer]    As the provider I attest to compliance with applicable laws and regulations related to telemedicine.            Data:    Session content: At the start of this group, patients were invited to check in by identifying themselves, describing their current emotional status, and identifying issues to address in this group.   Area(s) of treatment focus addressed in this session included Symptom Management and Personal Safety.    Chi reported feeling \"okay\" today but sore because he worked with his dad building a deck this weekend.  His goal for the day is to start doing his laundry.  He has been in bed all day and is hoping to stay out of bed for the rest of the day.  He reported being proud of spending time with his roommates this weekend instead of isolating.  He reported not feeling much " better after being more active but realizes it's good for him so he wants to continue to work towards being more active and spending less time in bed.      Therapeutic Interventions/Treatment Strategies:  Psychotherapist offered support, feedback and validation and reinforced use of skills. Treatment modalities used include Motivational Interviewing, Cognitive Behavioral Therapy and Dialectical Behavioral Therapy. Interventions include Behavioral Activation: Encouraged strategies to reduce individual procrastination and increase motivation by increasing goal-directed activities to enhance mood and reduce symptoms..    Assessment:    Patient response:   Patient responded to session by accepting feedback, listening and being attentive    Possible barriers to participation / learning include: and no barriers identified    Health Issues:   None reported       Substance Use Review:   Substance Use: Substance use has decreased    Mental Status/Behavioral Observations  Appearance:   Appropriate   Eye Contact:   Good   Psychomotor Behavior: Normal   Attitude:   Cooperative   Orientation:   All  Speech   Rate / Production: Normal    Volume:  Normal   Mood:    Depressed   Affect:    Blunted   Thought Content:   Safety reports  presence of suicidal ideation passive suicidal ideation   Thought Form:  Coherent  Logical     Insight:    Fair     Plan:     Safety Plan: No current safety concerns identified.  Recommended that patient call 911 or go to the local ED should there be a change in any of these risk factors.     Barriers to treatment: None identified    Patient Contracts (see media tab):  None    Substance Use: Not addressed in session     Continue or Discharge: Patient will continue in Adult Dual Disorder Program (DDP) as planned. Patient is likely to benefit from learning and using skills as they work toward the goals identified in their treatment plan.      Brenna Alcantara, [facility]  August 17, 2020

## 2020-08-18 NOTE — PROGRESS NOTES
Patient Active Problem List   Diagnosis     Major depressive disorder, recurrent episode, moderate (H)       Current Outpatient Medications:      mirtazapine (REMERON) 15 MG tablet, Take 15 mg by mouth At Bedtime, Disp: , Rfl:      venlafaxine (EFFEXOR-ER) 225 MG 24 hr tablet, Take 225 mg by mouth daily, Disp: , Rfl:   Psychiatry staffing: case discussed  Diagnosis:  As above, in bed much of day.  Starting delayed by symptom level.

## 2020-08-18 NOTE — GROUP NOTE
Psychoeducation Group Note    PATIENT'S NAME: Chi Henderson  MRN:   [id number]  :   1995  ACCT. NUMBER: [id number]  DATE OF SERVICE: 20  START TIME:  2:00 PM  END TIME:  2:50 PM  FACILITATOR: Ted Boles OT  TOPIC: MH Life Skills Group: Sensory Approaches in Mental Health  Adult Mental Health Day Treatment  TRACK: 1B    Telemedicine Visit: The patient's condition can be safely assessed and treated via synchronous audio and visual telemedicine encounter.      Reason for Telemedicine Visit: Services only offered telehealth and Covid 19    Originating Site (Patient Location): Patient's home    Distant Site (Provider Location): [facility]: [facility]    Consent:  The patient/guardian has verbally consented to: the potential risks and benefits of telemedicine (video visit) versus in person care; bill my insurance or make self-payment for services provided; and responsibility for payment of non-covered services.     Mode of Communication:  Video Conference via [employer]    As the provider I attest to compliance with applicable laws and regulations related to telemedicine.      NUMBER OF PARTICIPANTS: 6    Summary of Group / Topics Discussed:  Sensory Approaches in Mental Health:  Self Regulation Through Sensory Modulation:  Patients were provided with education on Autonomic Nervous System activation and taught skills that can be used immediately to help them calm down and regain self control.  Patients were taught how to recognize specific signs and symptoms of their individualized state of arousal and how to make changes when needed.  Patients explored body based skills (bottom up processing skills) and techniques to help manage symptoms and change level of arousal when needed to be in control and comfortable so they are able to function in different environments.       Patient Session Goals / Objectives:    Identified specific and individualized neurosensory skills to help  when distressed and for crisis management    Identified signs and symptoms of current level of arousal and ways to make changes in level of arousal when needed    Established a plan for practice of these skills in their own environments        Patient Participation / Response:  Fully participated with the group by sharing personal reflections / insights and openly received / provided feedback with other participants.    Verbalized understanding of content and Patient would benefit from additional opportunities to practice the content to be able to generalize it to their everyday life with increased intentionality, consistency, and efficacy in support of their psychiatric recovery    Treatment Plan:  Patient has a current master individualized treatment plan.  See Epic treatment plan for more information.    Tde Boles, OT

## 2020-08-19 ENCOUNTER — HOSPITAL ENCOUNTER (OUTPATIENT)
Dept: BEHAVIORAL HEALTH | Facility: CLINIC | Age: 25
End: 2020-08-19
Attending: PSYCHIATRY & NEUROLOGY
Payer: COMMERCIAL

## 2020-08-19 PROCEDURE — 90853 GROUP PSYCHOTHERAPY: CPT | Mod: GT | Performed by: COUNSELOR

## 2020-08-19 PROCEDURE — 90853 GROUP PSYCHOTHERAPY: CPT | Mod: GT,95 | Performed by: COUNSELOR

## 2020-08-19 PROCEDURE — G0177 OPPS/PHP; TRAIN & EDUC SERV: HCPCS | Mod: GT

## 2020-08-19 NOTE — GROUP NOTE
Psychotherapy Group Note    PATIENT'S NAME: Chi Henderson  MRN:   [id number]  :   1995  ACCT. NUMBER: [id number]  DATE OF SERVICE: 20  START TIME:  2:00 PM  END TIME:  2:50 PM  [facility]ACILIT[facility]  TOPIC: MH EBP Group: Emotions Management  Adult Mental Health Day Treatment  TRACK: 1B    NUMBER OF PARTICIPANTS: 6    Telemedicine Visit: The patient's condition can be safely assessed and treated via synchronous audio and visual telemedicine encounter.      Reason for Telemedicine Visit: Services only offered telehealth    Originating Site (Patient Location): Patient's home    Distant Site (Provider Location): Provider Remote Setting    Consent:  The patient/guardian has verbally consented to: the potential risks and benefits of telemedicine (video visit) versus in person care; bill my insurance or make self-payment for services provided; and responsibility for payment of non-covered services.     Mode of Communication:  Video Conference via [employer]    As the provider I attest to compliance with applicable laws and regulations related to telemedicine.      Summary of Group / Topics Discussed:  Emotions Management: Guilt and Shame: Patients explored and shared personal experiences associated with feelings of guilt and shame.  Group explored how these feelings develop, what they mean to each individual, and how to increase acceptance and usefulness of these feelings.  Group members assisted one another to contextualize these concepts and promote healing.     Patient Session Goals / Objectives:    Discuss and review definitions and personal views/experiences with guilt and shame    Understand the differences between guilt and shame    Explore how feelings of guilt and shame impact functioning    Understand and practice strategies to manage difficult emotions and move towards healing    Understand and normalize difficult emotions through group discussion    Understand the utility of guilt  and shame    Target  unwanted  emotions for change      Patient Participation / Response:  Fully participated with the group by sharing personal reflections / insights and openly received / provided feedback with other participants.    Demonstrated understanding of topics discussed through group discussion and participation, Expressed understanding of the relevance / importance of emotions management skills at distressing times in life, Self-aware of experiences with difficult emotions, and strategies to employ to manage them and Demonstrated knowledge of when to consider applying a variety of emotions management skills in daily life    Treatment Plan:  Patient has a current master individualized treatment plan.  See Epic treatment plan for more information.    Brenna Alcantara, [facility]

## 2020-08-19 NOTE — GROUP NOTE
Psychoeducation Group Note    PATIENT'S NAME: Chi Henderson  MRN:   [id number]  :   1995  ACCT. NUMBER: [id number]  DATE OF SERVICE: 20  START TIME:  3:00 PM  END TIME:  3:50 PM  FACILITATOR: Charels Lyn OTR/L  TOPIC: MH Life Skills Group: Lifestyle Balance and Structure  Telemedicine Visit: The patient's condition can be safely assessed and treated via synchronous audio and visual telemedicine encounter.      Reason for Telemedicine Visit: COVID-19    Originating Site (Patient Location): Patient's home    Distant Site (Provider Location): [facility]: [facility]    Consent:  The patient/guardian has verbally consented to: the potential risks and benefits of telemedicine (video visit) versus in person care; bill my insurance or make self-payment for services provided; and responsibility for payment of non-covered services.     Mode of Communication:  Video Conference via [employer]    As the provider I attest to compliance with applicable laws and regulations related to telemedicine.   Adult Mental Health Day Treatment  TRACK: 1B    NUMBER OF PARTICIPANTS: 5    Summary of Group / Topics Discussed:  Lifestyle Balance and Strucure:  Occupations: A  Healthy Balanced Lifestyle: Patients were introduced to the importance of daily occupations in support of mental health management by exploring a balanced lifestyle.  Patients identified how they spend their time and skills to bring this into better balance.  Patients were assisted to establish, restore, and/or modify performance skills and patterns for improved engagement and promotion of positive mental health through meaningful occupations.  Patients gained awareness of the connection between who they are (self identity) with what they do.    Patient Session Goals / Objectives:    Increased awareness of how patient s functioning in identified meaningful occupations are impacted by their mental health status     Developed  performance skills and performance patterns to enhance occupational engagement through creating a balanced lifestyle    Explored ways to generalize new awareness and skills to their everyday life        Patient Participation / Response:  Fully participated with the group by sharing personal reflections / insights and openly received / provided feedback with other participants.    Patient Presentation: Low mood and energy level but able to listen and participate effectively. His depression seems to be interfering with most all areas of his life.    Treatment Plan:  Patient has a current master individualized treatment plan.  See Epic treatment plan for more information.    Charles Lyn, OTR/L

## 2020-08-19 NOTE — GROUP NOTE
"Process Group Note    PATIENT'S NAME: Chi Henderson  MRN:   [id number]  :   1995  ACCT. NUMBER: [id number]  DATE OF SERVICE: 20  START TIME:  1:00 PM  END TIME:  1:50 PM  FACILITATOR: Brenna Alcantara [facility]  TOPIC:  Process Group    Diagnoses:  296.32 (F33.1) Major Depressive Disorder, Recurrent Episode, Moderate _.  4. Other Diagnoses that is relevant to services:   300.02 (F41.1) Generalized Anxiety       Adult Mental Health Day Treatment  TRACK: 1B    NUMBER OF PARTICIPANTS: 6    Telemedicine Visit: The patient's condition can be safely assessed and treated via synchronous audio and visual telemedicine encounter.      Reason for Telemedicine Visit: Services only offered telehealth    Originating Site (Patient Location): Patient's home    Distant Site (Provider Location): Provider Remote Setting    Consent:  The patient/guardian has verbally consented to: the potential risks and benefits of telemedicine (video visit) versus in person care; bill my insurance or make self-payment for services provided; and responsibility for payment of non-covered services.     Mode of Communication:  Video Conference via [employer]    As the provider I attest to compliance with applicable laws and regulations related to telemedicine.            Data:    Session content: At the start of this group, patients were invited to check in by identifying themselves, describing their current emotional status, and identifying issues to address in this group.   Area(s) of treatment focus addressed in this session included Symptom Management and Personal Safety.    Chi reported feeling \"not great\" today.  He reported he often gets stuck on a certain thought and ruminates about it for days at at time and today he can't stop thinking about his weight and how he has been unable to lose weight.  His goal for the day is to spend time with his mom later today.  Writer provided psychoeducaiton about grounding and suggested some " "grounding strategies he can try to interrupt his ruminating thoughts.  Other group members provided strategies that work for them as well.    Therapeutic Interventions/Treatment Strategies:  Psychotherapist offered support, feedback and validation and reinforced use of skills. Treatment modalities used include Motivational Interviewing, Cognitive Behavioral Therapy and Dialectical Behavioral Therapy. Interventions include Coping Skills: Discussed how the use of intentional \"in the moment\" actions can help reduce distress.    Assessment:    Patient response:   Patient responded to session by accepting feedback, giving feedback and listening    Possible barriers to participation / learning include: and no barriers identified    Health Issues:   Yes: Sleep disturbance, Associated Psychological Distress       Substance Use Review:   Substance Use: No active concerns identified.    Mental Status/Behavioral Observations  Appearance:   Appropriate   Eye Contact:   Good   Psychomotor Behavior: Normal   Attitude:   Cooperative   Orientation:   All  Speech   Rate / Production: Normal    Volume:  Normal   Mood:    Anxious  Depressed   Affect:    Blunted   Thought Content:   Clear  Thought Form:  Coherent  Logical  Obsessive     Insight:    Fair     Plan:     Safety Plan: No current safety concerns identified.  Recommended that patient call 911 or go to the local ED should there be a change in any of these risk factors.     Barriers to treatment: None identified    Patient Contracts (see media tab):  None    Substance Use: Not addressed in session     Continue or Discharge: Patient will continue in Adult Day Treatment (ADT)  as planned. Patient is likely to benefit from learning and using skills as they work toward the goals identified in their treatment plan.      Brenna Alcantara, [facility]  August 19, 2020  "

## 2020-08-20 ENCOUNTER — HOSPITAL ENCOUNTER (OUTPATIENT)
Dept: BEHAVIORAL HEALTH | Facility: CLINIC | Age: 25
End: 2020-08-20
Attending: PSYCHIATRY & NEUROLOGY
Payer: COMMERCIAL

## 2020-08-20 PROCEDURE — G0177 OPPS/PHP; TRAIN & EDUC SERV: HCPCS | Mod: GT,95

## 2020-08-20 PROCEDURE — 90853 GROUP PSYCHOTHERAPY: CPT | Mod: GT,95 | Performed by: COUNSELOR

## 2020-08-20 NOTE — GROUP NOTE
Psychotherapy Group Note    PATIENT'S NAME: Chi Henderson  MRN:   [id number]  :   1995  ACCT. NUMBER: [id number]  DATE OF SERVICE: 20  START TIME:  3:00 PM  END TIME:  3:50 PM  FACILITATOR: Brenna Alcantara LPCC  TOPIC: MH EBP Group: Specialty Awareness  Adult Mental Health Day Treatment  TRACK: 1B    NUMBER OF PARTICIPANTS: 5    Telemedicine Visit: The patient's condition can be safely assessed and treated via synchronous audio and visual telemedicine encounter.      Reason for Telemedicine Visit: Services only offered telehealth    Originating Site (Patient Location): Patient's home    Distant Site (Provider Location): Provider Remote Setting    Consent:  The patient/guardian has verbally consented to: the potential risks and benefits of telemedicine (video visit) versus in person care; bill my insurance or make self-payment for services provided; and responsibility for payment of non-covered services.     Mode of Communication:  Video Conference via [employer]    As the provider I attest to compliance with applicable laws and regulations related to telemedicine.      Summary of Group / Topics Discussed:  Specialty Topics: Forgiveness: Patients were provided with an overview of the topic of forgiveness including how to better understand the nature of forgiveness of others and oneself. Exploring the phases of forgiveness and how one works them was covered. Patients were able to explore how practicing forgiveness may positively impact their functioning.     Patient Session Goals / Objectives:    Discussed definitions of forgiveness and self-forgiveness    Explored the phases and process of forgiveness    Discussed benefits of forgiveness to their relationships with themselves and others, connecting the concept to mindfulness and acceptance    Assisted patients in recognizing when practicing forgiveness may be helpful      Patient Participation / Response:  Fully participated with the group by  sharing personal reflections / insights and openly received / provided feedback with other participants.    Demonstrated understanding of topics discussed through group discussion and participation, Identified / Expressed readiness to act on skill suggestions discussed in topic and Verbalized understanding of ways to proactively manage illness    Treatment Plan:  Patient has a current master individualized treatment plan.  See Epic treatment plan for more information.    Brenna Alcantara, [facility]

## 2020-08-20 NOTE — GROUP NOTE
Psychoeducation Group Note    PATIENT'S NAME: Chi Henderson  MRN:   [id number]  :   1995  ACCT. NUMBER: [id number]  DATE OF SERVICE: 20  START TIME:  1:00 PM  END TIME:  1:50 PM  FACILITATOR: Lou Conway RN  TOPIC:  RN Group: Specialty Health  Telemedicine Visit: The patient's condition can be safely assessed and treated via synchronous audio and visual telemedicine encounter.      Reason for Telemedicine Visit:  covid19    Originating Site (Patient Location): Patient's home    Distant Site (Provider Location): Provider Remote Setting    Consent:  The patient/guardian has verbally consented to: the potential risks and benefits of telemedicine (video visit) versus in person care; bill my insurance or make self-payment for services provided; and responsibility for payment of non-covered services.     Mode of Communication:  Video Conference via [employer]    As the provider I attest to compliance with applicable laws and regulations related to telemedicine.      Adult Mental Health Day Treatment  TRACK: 1B    NUMBER OF PARTICIPANTS: 5-6    Summary of Group / Topics Discussed:  Specialty Health:  Specialty Health: Anatomy of Trust: Patients discussed trust and watched the Lalo Talk by Sarah Malhotra, discussing her acronym BRAVING.    Patient Session Goals / Objectives:  ? Patients will assess their own relationships in the context of trust.  ? Patients will create goals r/t their awareness of trust                Patient Participation / Response:  Fully participated with the group by sharing personal reflections / insights and openly received / provided feedback with other participants.    Demonstrated understanding of topics discussed through group discussion and participation and Identified / Expressed personal readiness to practice skills    Treatment Plan:  Patient has a current master individualized treatment plan.  See Epic treatment plan for more information.    oLu Conway RN

## 2020-08-20 NOTE — GROUP NOTE
"Process Group Note    PATIENT'S NAME: Chi Henderson  MRN:   [id number]  :   1995  ACCT. NUMBER: [id number]  DATE OF SERVICE: 20  START TIME:  2:00 PM  END TIME:  2:50 PM  FACILITATOR: Brenna Alcantara [facility]  TOPIC:  Process Group    Diagnoses:  296.32 (F33.1) Major Depressive Disorder, Recurrent Episode, Moderate _.  4. Other Diagnoses that is relevant to services:   300.02 (F41.1) Generalized Anxiety       Adult Mental Health Day Treatment  TRACK: 1B    NUMBER OF PARTICIPANTS: 6    Telemedicine Visit: The patient's condition can be safely assessed and treated via synchronous audio and visual telemedicine encounter.      Reason for Telemedicine Visit: Services only offered telehealth    Originating Site (Patient Location): Patient's home    Distant Site (Provider Location): Provider Remote Setting    Consent:  The patient/guardian has verbally consented to: the potential risks and benefits of telemedicine (video visit) versus in person care; bill my insurance or make self-payment for services provided; and responsibility for payment of non-covered services.     Mode of Communication:  Video Conference via [employer]    As the provider I attest to compliance with applicable laws and regulations related to telemedicine.            Data:    Session content: At the start of this group, patients were invited to check in by identifying themselves, describing their current emotional status, and identifying issues to address in this group.   Area(s) of treatment focus addressed in this session included Symptom Management and Personal Safety.    Chi reported feeling \"not great\" today.  He reported that he learned that his grandmother's cancer returned and his family is not coping with it well.  His goal for the day is to call and schedule an appointment with his psychiatrist.  He reported that he has been doing some research on ECT and TMS because they have both been recommended to him.  He is not sure he " wants to do either one but plans to discuss it with his psychiatrist.  Client declined additional process time but contributed to group discussion and provided feedback and support to peers.      Therapeutic Interventions/Treatment Strategies:  Psychotherapist offered support, feedback and validation.    Assessment:    Patient response:   Patient responded to session by accepting feedback, giving feedback and listening    Possible barriers to participation / learning include: and no barriers identified    Health Issues:   None reported       Substance Use Review:   Substance Use: No active concerns identified.    Mental Status/Behavioral Observations  Appearance:   Appropriate   Eye Contact:   Good   Psychomotor Behavior: Normal   Attitude:   Cooperative   Orientation:   All  Speech   Rate / Production: Normal    Volume:  Normal   Mood:    Depressed   Affect:    Blunted   Thought Content:   Clear  Thought Form:  Coherent  Logical     Insight:    Good     Plan:     Safety Plan: No current safety concerns identified.  Recommended that patient call 911 or go to the local ED should there be a change in any of these risk factors.     Barriers to treatment: None identified    Patient Contracts (see media tab):  None    Substance Use: Not addressed in session     Continue or Discharge: Patient will continue in Adult Day Treatment (ADT)  as planned. Patient is likely to benefit from learning and using skills as they work toward the goals identified in their treatment plan.      Brenna Alcantara, [facility]  August 20, 2020

## 2020-08-21 ENCOUNTER — TELEPHONE (OUTPATIENT)
Dept: BEHAVIORAL HEALTH | Facility: CLINIC | Age: 25
End: 2020-08-21

## 2020-08-21 NOTE — TELEPHONE ENCOUNTER
Writer called to conduct RN health screen. Left VM requesting call back. Will reattempt.  Writer also attempted to reach pt through [employer].    Lou Conway RN on 8/21/2020 at 12:38 PM

## 2020-08-24 ENCOUNTER — HOSPITAL ENCOUNTER (OUTPATIENT)
Dept: BEHAVIORAL HEALTH | Facility: CLINIC | Age: 25
End: 2020-08-24
Attending: PSYCHIATRY & NEUROLOGY
Payer: COMMERCIAL

## 2020-08-24 PROCEDURE — 90853 GROUP PSYCHOTHERAPY: CPT | Mod: GT

## 2020-08-24 PROCEDURE — G0177 OPPS/PHP; TRAIN & EDUC SERV: HCPCS | Mod: GT

## 2020-08-24 PROCEDURE — 90853 GROUP PSYCHOTHERAPY: CPT | Mod: GT | Performed by: COUNSELOR

## 2020-08-24 NOTE — GROUP NOTE
Psychotherapy Group Note    PATIENT'S NAME: Chi Henderson  MRN:   [id number]  :   1995  ACCT. NUMBER: [id number]  DATE OF SERVICE: 20  START TIME:  3:00 PM  END TIME:  3:50 PM  FACILITATOR: Gabriela Aden LMFT  TOPIC:  EBP Group: Coping Skills  Adult Mental Health Day Treatment  TRACK: 1B    NUMBER OF PARTICIPANTS: 5    Summary of Group / Topics Discussed:  Coping Skills: Radical Acceptance: Patients learned radical acceptance as a way to tolerate heightened stress in the moment.  Patients identified situations that necessitate radical acceptance.  They focused on applying acceptance of the moment to tolerate difficult emotions and events. Patients discussed how to distinguish when this can be useful in their lives and when other skills are more relevant or helpful.    Patient Session Goals / Objectives:    Understand that some amount of pain exists for each of us in our lives    Process the difficulty of acceptance in our lives and benefits of radical acceptance to mood and functioning.    Demonstrate understanding of when to use the radical acceptance to tolerate distress by providing examples of situations where this could be applied.    Identify barriers to applying radical acceptance to difficult situations and explore strategies to overcome them    Telemedicine Visit: The patient's condition can be safely assessed and treated via synchronous audio and visual telemedicine encounter.      Reason for Telemedicine Visit: Services only offered telehealth    Originating Site (Patient Location): Patient's home    Distant Site (Provider Location): Provider Remote Setting    Consent:  The patient/guardian has verbally consented to: the potential risks and benefits of telemedicine (video visit) versus in person care; bill my insurance or make self-payment for services provided; and responsibility for payment of non-covered services.     Mode of Communication:  Video Conference via [employer]    As the  provider I attest to compliance with applicable laws and regulations related to telemedicine.    Patient Participation / Response:  Moderately participated, sharing some personal reflections / insights and adequately adequately received / provided feedback with other participants.    Demonstrated understanding of topics discussed through group discussion and participation and Practiced 2-3 new coping skills in session    Treatment Plan:  Patient has a current master individualized treatment plan.  See Epic treatment plan for more information.    DELMER Chaudhari

## 2020-08-24 NOTE — GROUP NOTE
Process Group Note    PATIENT'S NAME: Chi Henderson  MRN:   [id number]  :   1995  ACCT. NUMBER: [id number]  DATE OF SERVICE: 20  START TIME:  1:00 PM  END TIME:  1:50 PM  FACILITATOR: Samantha Brown LPCC  TOPIC:  Process Group    Diagnoses:  296.32 (F33.1) Major Depressive Disorder, Recurrent Episode, Moderate _.  4. Other Diagnoses that is relevant to services:   300.02 (F41.1) Generalized Anxiety     Adult Mental Health Day Treatment  TRACK: 1B    NUMBER OF PARTICIPANTS: 5    Telemedicine Visit: The patient's condition can be safely assessed and treated via synchronous audio and visual telemedicine encounter.      Reason for Telemedicine Visit: Services only offered telehealth    Originating Site (Patient Location): Patient's home    Distant Site (Provider Location): Provider Remote Setting    Consent:  The patient/guardian has verbally consented to: the potential risks and benefits of telemedicine (video visit) versus in person care; bill my insurance or make self-payment for services provided; and responsibility for payment of non-covered services.     Mode of Communication:  Video Conference via [employer]    As the provider I attest to compliance with applicable laws and regulations related to telemedicine.            Data:    Session content: At the start of this group, patients were invited to check in by identifying themselves, describing their current emotional status, and identifying issues to address in this group.   Area(s) of treatment focus addressed in this session included Symptom Management and Personal Safety.  He reported feeling annoyed. His goal is to make another psychiatrist appointment. He denied safety concerns and reported passive suicidal ideation. He committed to safety by interacting with his roommates, call his parents, and go get a part for a laundry machine. He was quiet for most of the group and he appeared to be listening.     Therapeutic Interventions/Treatment  Strategies:  Psychotherapist offered support, feedback and validation and reinforced use of skills.    Assessment:    Patient response:   Patient responded to session by accepting feedback, giving feedback and listening    Possible barriers to participation / learning include: and no barriers identified    Health Issues:   None reported       Substance Use Review:   Substance Use: No active concerns identified.    Mental Status/Behavioral Observations  Appearance:   Appropriate   Eye Contact:   Fair   Psychomotor Behavior: Restless   Attitude:   Cooperative  Pleasant  Orientation:   All  Speech   Rate / Production: Monotone  Slow    Volume:  Soft   Mood:    Anxious  Depressed   Affect:    Constricted   Thought Content:   Safety reports  presence of suicidal ideation passive suicidal ideation   Thought Form:  Coherent  Logical     Insight:    Fair     Plan:     Safety Plan: No current safety concerns identified.  Recommended that patient call 911 or go to the local ED should there be a change in any of these risk factors.     Barriers to treatment: None identified    Patient Contracts (see media tab):  None    Substance Use: Not addressed in session     Continue or Discharge: Patient will continue in Adult Day Treatment (ADT)  as planned. Patient is likely to benefit from learning and using skills as they work toward the goals identified in their treatment plan.      Samantha Brown, [facility]  August 24, 2020

## 2020-08-24 NOTE — GROUP NOTE
Psychoeducation Group Note    PATIENT'S NAME: Chi Henderson  MRN:   [id number]  :   1995  ACCT. NUMBER: [id number]  DATE OF SERVICE: 20  START TIME:  2:00 PM  END TIME:  2:50 PM  FACILITATOR: Ted Boles OT  TOPIC: MH Life Skills Group: Sensory Approaches in Mental Health  Adult Mental Health Day Treatment  TRACK: *1B    Telemedicine Visit: The patient's condition can be safely assessed and treated via synchronous audio and visual telemedicine encounter.      Reason for Telemedicine Visit: Services only offered telehealth and Covid 9    Originating Site (Patient Location): Patient's home    Distant Site (Provider Location): [facility]: [facility]    Consent:  The patient/guardian has verbally consented to: the potential risks and benefits of telemedicine (video visit) versus in person care; bill my insurance or make self-payment for services provided; and responsibility for payment of non-covered services.     Mode of Communication:  Video Conference via [employer]    As the provider I attest to compliance with applicable laws and regulations related to telemedicine.      NUMBER OF PARTICIPANTS: 5    Summary of Group / Topics Discussed:  Sensory Approaches in Mental Health:  Coping Through the Senses Introduction 1: Patients were introduced and taught about neurosensory based skills and strategies related to supporting effective self regulation skills.  Patients were taught about the development of sensory preferences and patterns, neurological thresholds and the 5 external sensory systems as strategies for coping with mental health symptoms and stressors.  Patients were provided with an experiential opportunity to increase self-awareness of helpful sensory input and self care activities. Patients were introduced on how to create supportive environments that encourage use of these skills.         Patient Session Goals / Objectives:    Identified specific and individualized  neurosensory skills to help when distressed      Identified skills learned and how this applies to current daily life    Established a plan for practice of these skills in their own environments        Patient Participation / Response:  Fully participated with the group by sharing personal reflections / insights and openly received / provided feedback with other participants.    Verbalized understanding of content and Patient would benefit from additional opportunities to practice the content to be able to generalize it to their everyday life with increased intentionality, consistency, and efficacy in support of their psychiatric recovery    Treatment Plan:  Patient has a current master individualized treatment plan.  See Epic treatment plan for more information.    Ted Boles, OT

## 2020-08-26 ENCOUNTER — TELEPHONE (OUTPATIENT)
Dept: BEHAVIORAL HEALTH | Facility: CLINIC | Age: 25
End: 2020-08-26

## 2020-08-26 ENCOUNTER — HOSPITAL ENCOUNTER (OUTPATIENT)
Dept: BEHAVIORAL HEALTH | Facility: CLINIC | Age: 25
End: 2020-08-26
Attending: PSYCHIATRY & NEUROLOGY
Payer: COMMERCIAL

## 2020-08-26 PROCEDURE — G0177 OPPS/PHP; TRAIN & EDUC SERV: HCPCS | Mod: GT,95

## 2020-08-26 PROCEDURE — 90853 GROUP PSYCHOTHERAPY: CPT | Mod: GT | Performed by: SOCIAL WORKER

## 2020-08-26 NOTE — GROUP NOTE
Process Group Note    PATIENT'S NAME: Chi Henderson  MRN:   [id number]  :   1995  ACCT. NUMBER: [id number]  DATE OF SERVICE: 20  START TIME:  1:00 PM  END TIME:  1:50 PM  FACILITATOR: Melvina Antunez LIC  TOPIC:  Process Group    Diagnoses:  296.32 (F33.1) Major Depressive Disorder, Recurrent Episode, Moderate  300.02 (F41.1) Generalized Anxiety Disorder       Adult Mental Health Day Treatment  TRACK: 1B    NUMBER OF PARTICIPANTS: 3    Telemedicine Visit: The patient's condition can be safely assessed and treated via synchronous audio and visual telemedicine encounter.      Reason for Telemedicine Visit: Services only offered telehealth    Originating Site (Patient Location): Patient's home    Distant Site (Provider Location): Provider Remote Setting    Consent:  The patient/guardian has verbally consented to: the potential risks and benefits of telemedicine (video visit) versus in person care; bill my insurance or make self-payment for services provided; and responsibility for payment of non-covered services.     Mode of Communication:  Video Conference via [employer]    As the provider I attest to compliance with applicable laws and regulations related to telemedicine.           Data:    Session content: At the start of this group, patients were invited to check in by identifying themselves, describing their current emotional status, and identifying issues to address in this group.   Area(s) of treatment focus addressed in this session included Symptom Management, Personal Safety and Community Resources/Discharge Planning.  Chi reported depressed mood, feeling annoyed at things, trouble getting stuff done, and sleeping to much.  He stated that he was able to fix the clothes dryer over a couple of days which he felt pleased about.  Client denied suicidal ideation, intent and plan. He stated that he realizes that he is better financially and has other blessings in his life.  He stated  "that he is trying to get a psychiatry appointment but is having difficulty waiting on hold for an appointment.    Therapeutic Interventions/Treatment Strategies:  Psychotherapist offered support, feedback and validation and reinforced use of skills. Treatment modalities used include Cognitive Behavioral Therapy. Interventions include Coping Skills: Discussed how the use of intentional \"in the moment\" actions can help reduce distress.    Assessment:    Patient response:   Patient responded to session by giving feedback, focusing on goals and being attentive    Possible barriers to participation / learning include: and no barriers identified    Health Issues:   None reported       Substance Use Review:   Substance Use: No active concerns identified.    Mental Status/Behavioral Observations  Appearance:   Appropriate   Eye Contact:   Good   Psychomotor Behavior: Normal   Attitude:   Cooperative   Orientation:   All  Speech   Rate / Production: Normal    Volume:  Normal   Mood:    Anxious  Depressed   Affect:    Appropriate   Thought Content:   Clear  Thought Form:  Coherent  Logical     Insight:    Good     Plan:     Safety Plan: No current safety concerns identified.  Recommended that patient call 911 or go to the local ED should there be a change in any of these risk factors.     Barriers to treatment: None identified    Patient Contracts (see media tab):  None    Substance Use: Not addressed in session     Continue or Discharge: Patient will continue in Adult Day Treatment (ADT)  as planned. Patient is likely to benefit from learning and using skills as they work toward the goals identified in their treatment plan.      Melvina Antunez, [facility]  August 26, 2020  "

## 2020-08-26 NOTE — GROUP NOTE
Psychoeducation Group Note    PATIENT'S NAME: Chi Henderson  MRN:   [id number]  :   1995  ACCT. NUMBER: [id number]  DATE OF SERVICE: 20  START TIME:  3:00 PM  END TIME:  3:50 PM  FACILITATOR: Charles Lyn OTR/L  TOPIC: MH Life Skills Group: Resiliency Development  Telemedicine Visit: The patient's condition can be safely assessed and treated via synchronous audio and visual telemedicine encounter.      Reason for Telemedicine Visit: COVID-19    Originating Site (Patient Location): Patient's home    Distant Site (Provider Location): [facility]: [facility]    Consent:  The patient/guardian has verbally consented to: the potential risks and benefits of telemedicine (video visit) versus in person care; bill my insurance or make self-payment for services provided; and responsibility for payment of non-covered services.     Mode of Communication:  Video Conference via [employer]u    As the provider I attest to compliance with applicable laws and regulations related to telemedicine.   Adult Mental Health Day Treatment  TRACK: 1B    NUMBER OF PARTICIPANTS: 3    Summary of Group / Topics Discussed:  Resiliency Development:  Self-Awareness and Self-Confidence: Patients explored and identified their strengths, emotions, barriers, skills, and behavior patterns that occur when changes happen in life.  Focus was on recognizing these aspects and developing ways to help support moving forward, making changes as needed to support resiliency.      Patient Session Goals / Objectives:    Identified emotions, barriers, skills, strengths, and behavior patterns that occur when changes happen in life and how to effectively cope     Improved awareness of the process of change and skills and strategies that support this       Established a plan for practice of these skills in their own environments    Practiced and reflected on how to generalize taught skills to their everyday life        Patient  Participation / Response:  Fully participated with the group by sharing personal reflections / insights and openly received / provided feedback with other participants.    Patient Presentation: Calm,alert,focused with low mood and stable thought process. Although patient still feels much the same as last week overall observed presentation seemed better. Patient reported frustration with being on hold for long periods of time seeking out information for TMS or ECT for depression treatment.    Treatment Plan:  Patient has a current master individualized treatment plan.  See Epic treatment plan for more information.    Charles Lyn, OTR/L

## 2020-08-26 NOTE — TELEPHONE ENCOUNTER
Writer called to conduct RN health screen. Left VM requesting call back.  Have left 6 VM's and sent [employer] message.     Lou Conway RN on 8/26/2020 at 10:36 AM

## 2020-08-26 NOTE — PROGRESS NOTES
RN Review of Medical History / Physical Health Screen  Outpatient Mental Health Programs - Adult    Adult Mental Health Day Treatment    PATIENT'S NAME: Chi Henderson  MRN:   [id number]  :   1995  ACCT. NUMBER: [id number]  CURRENT AGE:  25 year old    DATE OF DIAGNOSTIC ASSESSMENT: 20  DATE OF ADMISSION: 8/3/20     Writer attempted to reach patient, leaving 6 VM's, notifying him in group, and sending [employer] msg. Unable to connect w/ patient. Information is based on chart review.  Please see Diagnostic Assessment for additional Medical History.     General Health:   Have you had any exposure to any communicable disease in the past 2-3 weeks? no unknown definitively      Are you aware of safe sex practices? yes unknown       Nutrition:    Are you on a special diet? If yes, please explain:  no unknown   Do you have any concerns regarding your nutritional status? If yes, please explain:  no   Have you had any appetite changes in the last 3 months?  No     Have you had any weight loss or weight gain in the last 3 months?  No     Do you have a history of an eating disorder? no   Do you have a history of being in an eating disorder program? no unknown     Patient height and weight recorded by RN in epic flowsheet: no Unable to connect with patient.      BMI Review:  Was the patient informed of BMI? no see above      Findings See above       Fall Risk:   Have you had any falls in the past 3 months? no none mentioned     Do you currently use any assistive devices for mobility?   no none mentioned/visualized      Additional Comments/Assessment: No medical concerns noted.    Per completion of the Medical History / Physical Health Screen, is there a recommendation to see / follow up with a primary care physician/clinic or dentist?    No.      Lou Conway RN  2020

## 2020-09-03 ENCOUNTER — HOSPITAL ENCOUNTER (OUTPATIENT)
Dept: BEHAVIORAL HEALTH | Facility: CLINIC | Age: 25
End: 2020-09-03
Attending: PSYCHIATRY & NEUROLOGY
Payer: COMMERCIAL

## 2020-09-03 PROCEDURE — G0177 OPPS/PHP; TRAIN & EDUC SERV: HCPCS | Mod: GT,95

## 2020-09-03 NOTE — GROUP NOTE
Psychoeducation Group Note    PATIENT'S NAME: Chi Henderson  MRN:   [id number]  :   1995  ACCT. NUMBER: [id number]  DATE OF SERVICE: 20  START TIME:  1:00 PM  END TIME:  1:50 PM  FACILITATOR: Lou Conway RN  TOPIC: JERRI RN Group: [facility]  Telemedicine Visit: The patient's condition can be safely assessed and treated via synchronous audio and visual telemedicine encounter.      Reason for Telemedicine Visit:  covid19    Originating Site (Patient Location): Patient's home    Distant Site (Provider Location): Provider Remote Setting    Consent:  The patient/guardian has verbally consented to: the potential risks and benefits of telemedicine (video visit) versus in person care; bill my insurance or make self-payment for services provided; and responsibility for payment of non-covered services.     Mode of Communication:  Video Conference via [employer]    As the provider I attest to compliance with applicable laws and regulations related to telemedicine.      Adult Mental Health Day Treatment  TRACK: 1B    NUMBER OF PARTICIPANTS: 5    Summary of Group / Topics Discussed:  Foundations of Health: Nutrition: Macronutrients: Patient were provided education on major macronutrients, their role in the body, and why it is important to meet daily nutritional needs. Obstacles to meeting daily nutritional needs were identified in group discussion and strategies to promote improved nutrition were explored. Macronutrients discussed include: carbohydrates, proteins and amino acids, fats, fiber, and water.     Patient Session Goals / Objectives:  ? Discussed the role of diet on mood, physical health, energy level, and the development of chronic disease.  ? Identified daily nutritional needs recommended by the [employer] via My [employer] education resources  ? Developing increased health literacy skills in finding credible nutrition information from reliable sources        Patient Participation /  Response:  Moderately participated, sharing some personal reflections / insights and adequately adequately received / provided feedback with other participants.    Verbalized understanding of foundations of health topic    Treatment Plan:  Patient has a current master individualized treatment plan.  See Epic treatment plan for more information.    Lou Conway RN

## 2020-09-09 ENCOUNTER — HOSPITAL ENCOUNTER (OUTPATIENT)
Dept: BEHAVIORAL HEALTH | Facility: CLINIC | Age: 25
End: 2020-09-09
Attending: PSYCHIATRY & NEUROLOGY
Payer: COMMERCIAL

## 2020-09-09 PROCEDURE — 90853 GROUP PSYCHOTHERAPY: CPT | Mod: GT | Performed by: MARRIAGE & FAMILY THERAPIST

## 2020-09-09 NOTE — GROUP NOTE
Process Group Note    PATIENT'S NAME: Chi Henderson  MRN:   [id number]  :   1995  ACCT. NUMBER: [id number]  DATE OF SERVICE: 20  START TIME:  1:00 PM  END TIME:  1:50 PM  FACILITATOR: Raoul Campos LMFT  TOPIC:  Process Group    Diagnoses:  296.32 (F33.1) Major Depressive Disorder, Recurrent Episode, Moderate  300.02 (F41.1) Generalized Anxiety Disorder       Adult Mental Health Day Treatment  TRACK: 1B    NUMBER OF PARTICIPANTS: 6    Telemedicine Visit: The patient's condition can be safely assessed and treated via synchronous audio and visual telemedicine encounter.      Reason for Telemedicine Visit: Services only offered telehealth    Originating Site (Patient Location): Patient's home    Distant Site (Provider Location): Provider Remote Setting    Consent:  The patient/guardian has verbally consented to: the potential risks and benefits of telemedicine (video visit) versus in person care; bill my insurance or make self-payment for services provided; and responsibility for payment of non-covered services.     Mode of Communication:  Video Conference via [employer]    As the provider I attest to compliance with applicable laws and regulations related to telemedicine.           Data:    Session content: At the start of this group, patients were invited to check in by identifying themselves, describing their current emotional status, and identifying issues to address in this group.   Area(s) of treatment focus addressed in this session included Symptom Management, Personal Safety and Develop Socialization / Interpersonal Relationship Skills.  Chi reports feeling better than he has in the past week, he is feeling irritable eleni sad today as well, he is working on a goal of practicing self care today, he is using alrams to get it done, heis struggling with fallin gasleep, reports passive si-can follow safety plan, denies chemical use, is taking rx, forgot 2 days this week, is proud that he  got himselrf to do laundry and got his washer fixed, saw his parents, he would liek listening Client declined additional process time but contributed to group discussion and provided feedback and support to peers.      Therapeutic Interventions/Treatment Strategies:  Psychotherapist offered support, feedback and validation and reinforced use of skills.    Assessment:    Patient response:   Patient responded to session by giving feedback, listening, being attentive and appearing alert    Possible barriers to participation / learning include: and no barriers identified    Health Issues:   None reported       Substance Use Review:   Substance Use: No active concerns identified.    Mental Status/Behavioral Observations  Appearance:   Appropriate   Eye Contact:   Good   Psychomotor Behavior: Normal   Attitude:   Cooperative   Orientation:   All  Speech   Rate / Production: Normal    Volume:  Normal   Mood:    Depressed   Affect:    Blunted  Flat   Thought Content:   Clear and Safety denies any current safety concerns including suicidal ideation, self-harm, and homicidal ideation  Thought Form:  Coherent  Logical     Insight:    Good     Plan:     Safety Plan: No current safety concerns identified.  Recommended that patient call 911 or go to the local ED should there be a change in any of these risk factors.     Barriers to treatment: None identified    Patient Contracts (see media tab):  None    Substance Use: Not addressed in session     Continue or Discharge: Patient will continue in Adult Day Treatment (ADT)  as planned. Patient is likely to benefit from learning and using skills as they work toward the goals identified in their treatment plan.      Josee Campos, LMFT  September 9, 2020

## 2020-09-09 NOTE — GROUP NOTE
Psychotherapy Group Note    PATIENT'S NAME: Chi Henderson  MRN:   [id number]  :   1995  ACCT. NUMBER: [id number]  DATE OF SERVICE: 20  START TIME:  2:00 PM  END TIME:  2:50 PM  FACILITATOR: Raoul Campos LMFT  TOPIC: MH EBP Group: Specialty Awareness  Adult Mental Health Day Treatment  TRACK: 1B    NUMBER OF PARTICIPANTS: 6    Telemedicine Visit: The patient's condition can be safely assessed and treated via synchronous audio and visual telemedicine encounter.      Reason for Telemedicine Visit: Services only offered telehealth    Originating Site (Patient Location): Patient's home    Distant Site (Provider Location): Provider Remote Setting    Consent:  The patient/guardian has verbally consented to: the potential risks and benefits of telemedicine (video visit) versus in person care; bill my insurance or make self-payment for services provided; and responsibility for payment of non-covered services.     Mode of Communication:  Video Conference via [employer]    As the provider I attest to compliance with applicable laws and regulations related to telemedicine.   Summary of Group / Topics Discussed:  Specialty Topics: Hope: The topic of hope was presented in order to help patients better understand the symptoms of hopelessness and how to become more hopeful. Patients discussed their current awareness of the topic and relevance to their functioning. Individual experiences with symptoms and treatment options were also discussed. Patients explored options for ongoing/future treatment and symptom management.      Patient Session Goals / Objectives:    Discussed definition of hopelessness    Discussed how hopelessness impacts functioning    Set a plan to utilize skills to reduce hopelessness        Patient Participation / Response:  Fully participated with the group by sharing personal reflections / insights and openly received / provided feedback with other participants.    Demonstrated  understanding of topics discussed through group discussion and participation    Treatment Plan:  Patient has a current master individualized treatment plan.  See Epic treatment plan for more information.    Josee Campos LMFT

## 2020-09-14 ENCOUNTER — HOSPITAL ENCOUNTER (OUTPATIENT)
Dept: BEHAVIORAL HEALTH | Facility: CLINIC | Age: 25
End: 2020-09-14
Attending: PSYCHIATRY & NEUROLOGY
Payer: COMMERCIAL

## 2020-09-14 PROCEDURE — 90853 GROUP PSYCHOTHERAPY: CPT | Mod: GT | Performed by: COUNSELOR

## 2020-09-14 PROCEDURE — 90853 GROUP PSYCHOTHERAPY: CPT | Mod: GT,95 | Performed by: COUNSELOR

## 2020-09-14 NOTE — GROUP NOTE
Psychotherapy Group Note    PATIENT'S NAME: Chi Henderson  MRN:   [id number]  :   1995  ACCT. NUMBER: [id number]  DATE OF SERVICE: 20  START TIME:  2:00 PM  END TIME:  2:50 PM  FACILITATOR: Brenna Alcantara LPCC  TOPIC: MH EBP Group: Specialty Awareness  Adult Mental Health Day Treatment  TRACK: 1B    NUMBER OF PARTICIPANTS: 6    Telemedicine Visit: The patient's condition can be safely assessed and treated via synchronous audio and visual telemedicine encounter.      Reason for Telemedicine Visit: Services only offered telehealth    Originating Site (Patient Location): Patient's home    Distant Site (Provider Location): Provider Remote Setting    Consent:  The patient/guardian has verbally consented to: the potential risks and benefits of telemedicine (video visit) versus in person care; bill my insurance or make self-payment for services provided; and responsibility for payment of non-covered services.     Mode of Communication:  Video Conference via [employer]    As the provider I attest to compliance with applicable laws and regulations related to telemedicine.      Summary of Group / Topics Discussed:  Specialty Topics: Grief/Transitions: Patients received an overview of the grief process.  Patients explored their relationship to loss and how that has affected their mental health symptoms.  Strategies for recognizing loss and ideas for engaging in the grief process was presented and discussed.  Patients identified needs for support and coping skills to manage loss.  The purpose of this specialty topic is to help patients identify the aspects of change due to loss that individuals experience in addition to mental health symptoms to better cope with the grief, loss, and life transitions.      Patient Session Goals / Objectives:    Identified grief, loss, and life transitions     Discussed how grief impacts mental health symptoms and disrupts usual functioning    Identified needs for support and  coping with grief and planned further action for coping        Patient Participation / Response:  Fully participated with the group by sharing personal reflections / insights and openly received / provided feedback with other participants.    Demonstrated understanding of topics discussed through group discussion and participation, Identified / Expressed readiness to act on skill suggestions discussed in topic and Verbalized understanding of ways to proactively manage illness    Treatment Plan:  Patient has a current master individualized treatment plan.  See Epic treatment plan for more information.    Brenna Alcantara, [facility]

## 2020-09-14 NOTE — GROUP NOTE
"Process Group Note    PATIENT'S NAME: Chi Henderson  MRN:   [id number]  :   1995  ACCT. NUMBER: [id number]  DATE OF SERVICE: 20  START TIME:  1:00 PM  END TIME:  1:50 PM  FACILITATOR: Brenna Alcantara [facility]  TOPIC:  Process Group    Diagnoses:  296.32 (F33.1) Major Depressive Disorder, Recurrent Episode, Moderate _.  4. Other Diagnoses that is relevant to services:   300.02 (F41.1) Generalized Anxiety       Adult Mental Health Day Treatment  TRACK: 1B    NUMBER OF PARTICIPANTS: 4    Telemedicine Visit: The patient's condition can be safely assessed and treated via synchronous audio and visual telemedicine encounter.      Reason for Telemedicine Visit: Services only offered telehealth    Originating Site (Patient Location): Patient's home    Distant Site (Provider Location): Provider Remote Setting    Consent:  The patient/guardian has verbally consented to: the potential risks and benefits of telemedicine (video visit) versus in person care; bill my insurance or make self-payment for services provided; and responsibility for payment of non-covered services.     Mode of Communication:  Video Conference via [employer]    As the provider I attest to compliance with applicable laws and regulations related to telemedicine.            Data:    Session content: At the start of this group, patients were invited to check in by identifying themselves, describing their current emotional status, and identifying issues to address in this group.   Area(s) of treatment focus addressed in this session included Symptom Management and Personal Safety.    Chi reported feeling \"irritable\" but \"okay.\"  His goal for the day is to do his daily exercises that he has started doing.  He reported that the recent increase in medications has given him a bit more energy so he has been able to force himself to get out of bed and be more active even though he still doesn't feel like doing anything.  He is proud of himself for " being more active and forcing himself to keep moving.      Therapeutic Interventions/Treatment Strategies:  Psychotherapist offered support, feedback and validation and reinforced use of skills. Treatment modalities used include Motivational Interviewing, Cognitive Behavioral Therapy and Dialectical Behavioral Therapy. Interventions include Behavioral Activation: Encouraged strategies to reduce individual procrastination and increase motivation by increasing goal-directed activities to enhance mood and reduce symptoms..    Assessment:    Patient response:   Patient responded to session by accepting feedback, giving feedback and listening    Possible barriers to participation / learning include: and no barriers identified    Health Issues:   None reported       Substance Use Review:   Substance Use: No active concerns identified.    Mental Status/Behavioral Observations  Appearance:   Appropriate   Eye Contact:   Good   Psychomotor Behavior: Normal   Attitude:   Cooperative   Orientation:   All  Speech   Rate / Production: Normal    Volume:  Normal   Mood:    Depressed   Affect:    Blunted   Thought Content:   Safety reports  presence of suicidal ideation active suicidal ideation  and passive suicidal ideation   Thought Form:  Coherent  Logical     Insight:    Good     Plan:     Safety Plan: Committed to safety and agreed to follow previously developed safety coping plan.      Barriers to treatment: None identified    Patient Contracts (see media tab):  None    Substance Use: Not addressed in session     Continue or Discharge: Patient will continue in Adult Day Treatment (ADT)  as planned. Patient is likely to benefit from learning and using skills as they work toward the goals identified in their treatment plan.      Brenna Alcantara, [facility]  September 14, 2020

## 2020-09-22 ENCOUNTER — TELEPHONE (OUTPATIENT)
Dept: BEHAVIORAL HEALTH | Facility: CLINIC | Age: 25
End: 2020-09-22

## 2020-09-22 NOTE — TELEPHONE ENCOUNTER
Writer left a  for Chi informing him that due to his poor attendance, if he does not attend group tomorrow and does not contact writer, he will be discharged from ADT.    Brenna Alcantara [facility] on 9/22/2020 at 12:54 PM

## 2020-11-22 ENCOUNTER — HEALTH MAINTENANCE LETTER (OUTPATIENT)
Age: 25
End: 2020-11-22

## 2021-09-19 ENCOUNTER — HEALTH MAINTENANCE LETTER (OUTPATIENT)
Age: 26
End: 2021-09-19

## 2022-01-09 ENCOUNTER — HEALTH MAINTENANCE LETTER (OUTPATIENT)
Age: 27
End: 2022-01-09

## 2022-11-20 ENCOUNTER — HEALTH MAINTENANCE LETTER (OUTPATIENT)
Age: 27
End: 2022-11-20

## 2023-04-15 ENCOUNTER — HEALTH MAINTENANCE LETTER (OUTPATIENT)
Age: 28
End: 2023-04-15